# Patient Record
Sex: FEMALE | Race: WHITE | NOT HISPANIC OR LATINO | URBAN - METROPOLITAN AREA
[De-identification: names, ages, dates, MRNs, and addresses within clinical notes are randomized per-mention and may not be internally consistent; named-entity substitution may affect disease eponyms.]

---

## 2020-09-21 ENCOUNTER — HISTORICAL (OUTPATIENT)
Dept: ADMINISTRATIVE | Facility: HOSPITAL | Age: 65
End: 2020-09-21

## 2021-06-18 ENCOUNTER — HISTORICAL (OUTPATIENT)
Dept: ADMINISTRATIVE | Facility: HOSPITAL | Age: 66
End: 2021-06-18

## 2021-06-24 ENCOUNTER — HISTORICAL (OUTPATIENT)
Dept: SURGERY | Facility: HOSPITAL | Age: 66
End: 2021-06-24

## 2021-08-09 ENCOUNTER — HISTORICAL (OUTPATIENT)
Dept: ADMINISTRATIVE | Facility: HOSPITAL | Age: 66
End: 2021-08-09

## 2021-09-20 ENCOUNTER — HISTORICAL (OUTPATIENT)
Dept: ADMINISTRATIVE | Facility: HOSPITAL | Age: 66
End: 2021-09-20

## 2022-04-07 ENCOUNTER — HISTORICAL (OUTPATIENT)
Dept: ADMINISTRATIVE | Facility: HOSPITAL | Age: 67
End: 2022-04-07

## 2022-04-24 VITALS
HEIGHT: 66 IN | DIASTOLIC BLOOD PRESSURE: 76 MMHG | SYSTOLIC BLOOD PRESSURE: 128 MMHG | OXYGEN SATURATION: 98 % | WEIGHT: 152 LBS | BODY MASS INDEX: 24.43 KG/M2

## 2022-04-25 LAB
AGE: 67
ALBUMIN SERPL-MCNC: 4.2 G/DL (ref 3.4–5)
ALBUMIN/GLOB SERPL: 1.8 {RATIO}
ALP SERPL-CCNC: 76 U/L (ref 50–144)
ALT SERPL-CCNC: 26 U/L (ref 1–45)
ANION GAP SERPL CALC-SCNC: 3 MMOL/L (ref 2–13)
AST SERPL-CCNC: 35 U/L (ref 14–36)
BILIRUB SERPL-MCNC: 1.02 MG/DL (ref 0–1)
BUN SERPL-MCNC: 15 MG/DL (ref 7–20)
CALCIUM SERPL-MCNC: 8.9 MG/DL (ref 8.4–10.2)
CHLORIDE SERPL-SCNC: 108 MMOL/L (ref 98–110)
CHOLEST SERPL-MCNC: 211 MG/DL (ref 0–200)
CO2 SERPL-SCNC: 29 MMOL/L (ref 21–32)
CREAT SERPL-MCNC: 0.74 MG/DL (ref 0.52–1.04)
CREAT/UREA NIT SERPL: 20.3 (ref 12–20)
DEPRECATED CALCIDIOL+CALCIFEROL SERPL-MC: 26.4 NG/ML (ref 30–100)
GLOBULIN SER-MCNC: 2.3 G/DL (ref 2–3.9)
GLUCOSE SERPL-MCNC: 95 MG/DL (ref 70–115)
HDLC SERPL-MCNC: 71 MG/DL (ref 40–60)
LDLC SERPL CALC-MCNC: 97.1 MG/DL (ref 30–100)
POTASSIUM SERPL-SCNC: 4.2 MMOL/L (ref 3.5–5.1)
PROT SERPL-MCNC: 6.5 G/DL (ref 6.3–8.2)
SODIUM SERPL-SCNC: 140 MMOL/L (ref 135–145)
TRIGL SERPL-MCNC: 79 MG/DL (ref 30–200)

## 2022-04-30 NOTE — OP NOTE
Patient:   Raul Rainey            MRN: [id number]            FIN: [id number]               Age:   66 years     Sex:  Female     :  1955   Associated Diagnoses:   None   Author:   David Elizabeth Jr, MD      Preoperative diagnosis: Left distal radius fracture, intra-articular greater than 3 parts    Postoperative diagnosis: Left distal radius fracture, intra-articular greater than 3 parts    Attending surgeon: David Elizabeth MD    Procedure: Open reduction internal fixation of left distal radius fracture, intra-articular greater than 3 parts   Anesthesia: General plus regional block    Implants: Biomet DVR plate    Estimated blood loss: Minimal    Tourniquet time: About 30 minutes    Complications: None    History of present illness: Raul is a pleasant 66-year-old who sustained a distal radius fracture.  Radiographs revealed unacceptable alignment.  I recommended open reduction internal fixation.  The risks, benefits, and alternatives to therapy were presented.  The patient elects to proceed    Procedure: Raul was initially seen in the preoperative unit where a history and physical were reviewed without change.  The operative extremity was marked.  Consents were reviewed.  All questions were answered.  Anesthesia performed a preoperative block.  The patient was then taken to the operating room placed supine on the operative table where general anesthesia was induced.  Perioperative antibiotics were administered.  The operative extremity was prepped and draped in sterile fashion.  An attending lead timeout confirmed the operative side; I then began the procedure.    I made a standard volar Colby approach to the wrist.  I sharply incised through skin and subcutaneous tissue.  Identified the FCR tendon sheath and incised this in line with the incision.  I retracted the FCR ulnarly and incised through the deep fascia.  I swept the flexor pollicis longus out of the way and retracted ulnarly.  I  identified the pronator and made a inverted L incision in the pronator.  I then identified the fracture site which I reduced using a combination of manual traction and osteotome.  I provisionally pinned the fracture with a K wire.  I confirm reduction under fluoroscopy.  I then placed a Biomet DVR plate [place] on the distal radius.  I confirm placement of the plate under fluoroscopy.  I then placed a K wire in the distal hole and checked this on the lateral radiograph.  The wire was extra-articular.  I then placed 1 screw in the oblique hole in the shaft to give me some freedom in the distal and proximal plane.  I then placed a screw in the distal cluster and again confirmed placement and reduction under fluoroscopy.  Happy with this, I filled the distal cluster and lock the construct with 2 additional shaft screws.  The final construct was confirmed under fluoroscopy.  The hardware was stable in the fracture reduction was anatomic.  I copiously irrigated the wounds.  I released the tourniquet and obtained hemostasis.  The subcutaneous layer was closed with 2-0 Vicryl.  The skin was closed with a 3-0 Monocryl.  A sterile dressing and a resting hand splint were applied.  The patient was awoken unremarkable anesthesia and transfer the postoperative unit in good condition.

## 2022-05-16 ENCOUNTER — HISTORICAL (OUTPATIENT)
Dept: ADMINISTRATIVE | Facility: HOSPITAL | Age: 67
End: 2022-05-16

## 2022-12-02 LAB — BCS RECOMMENDATION EXT: NORMAL

## 2023-01-23 ENCOUNTER — DOCUMENTATION ONLY (OUTPATIENT)
Dept: ADMINISTRATIVE | Facility: HOSPITAL | Age: 68
End: 2023-01-23
Payer: MEDICARE

## 2023-05-16 ENCOUNTER — OFFICE VISIT (OUTPATIENT)
Dept: FAMILY MEDICINE | Facility: CLINIC | Age: 68
End: 2023-05-16
Payer: MEDICARE

## 2023-05-16 VITALS
OXYGEN SATURATION: 98 % | HEIGHT: 67 IN | HEART RATE: 70 BPM | DIASTOLIC BLOOD PRESSURE: 72 MMHG | WEIGHT: 154 LBS | TEMPERATURE: 98 F | SYSTOLIC BLOOD PRESSURE: 122 MMHG | BODY MASS INDEX: 24.17 KG/M2 | RESPIRATION RATE: 20 BRPM

## 2023-05-16 DIAGNOSIS — Z12.11 ENCOUNTER FOR COLORECTAL CANCER SCREENING: ICD-10-CM

## 2023-05-16 DIAGNOSIS — Z86.39 HISTORY OF VITAMIN D DEFICIENCY: ICD-10-CM

## 2023-05-16 DIAGNOSIS — Z12.12 ENCOUNTER FOR COLORECTAL CANCER SCREENING: ICD-10-CM

## 2023-05-16 DIAGNOSIS — E55.9 VITAMIN D DEFICIENCY: ICD-10-CM

## 2023-05-16 DIAGNOSIS — Z86.39 HISTORY OF NON ANEMIC VITAMIN B12 DEFICIENCY: ICD-10-CM

## 2023-05-16 DIAGNOSIS — Z79.899 LONG TERM USE OF DRUG: Primary | ICD-10-CM

## 2023-05-16 DIAGNOSIS — R19.00 PELVIC FULLNESS IN FEMALE: ICD-10-CM

## 2023-05-16 DIAGNOSIS — I10 PRIMARY HYPERTENSION: ICD-10-CM

## 2023-05-16 DIAGNOSIS — E53.8 VITAMIN B12 DEFICIENCY: ICD-10-CM

## 2023-05-16 DIAGNOSIS — Z13.9 SCREENING DUE: ICD-10-CM

## 2023-05-16 DIAGNOSIS — E78.5 HYPERLIPIDEMIA, UNSPECIFIED HYPERLIPIDEMIA TYPE: ICD-10-CM

## 2023-05-16 LAB
ABS NEUT CALC (OHS): 1.96 X10(3)/MCL (ref 2.1–9.2)
ALBUMIN SERPL-MCNC: 4.5 G/DL (ref 3.4–5)
ALBUMIN/GLOB SERPL: 1.6 RATIO
ALP SERPL-CCNC: 75 UNIT/L (ref 50–144)
ALT SERPL-CCNC: 18 UNIT/L (ref 1–45)
ANION GAP SERPL CALC-SCNC: 6 MEQ/L (ref 2–13)
AST SERPL-CCNC: 24 UNIT/L (ref 14–36)
BILIRUBIN DIRECT+TOT PNL SERPL-MCNC: 0.9 MG/DL (ref 0–1)
BUN SERPL-MCNC: 14 MG/DL (ref 7–20)
CALCIUM SERPL-MCNC: 9.6 MG/DL (ref 8.4–10.2)
CHLORIDE SERPL-SCNC: 105 MMOL/L (ref 98–110)
CHOLEST SERPL-MCNC: 244 MG/DL (ref 0–200)
CO2 SERPL-SCNC: 29 MMOL/L (ref 21–32)
CREAT SERPL-MCNC: 0.78 MG/DL (ref 0.66–1.25)
CREAT/UREA NIT SERPL: 18 (ref 12–20)
DEPRECATED CALCIDIOL+CALCIFEROL SERPL-MC: 26.6 NG/ML (ref 30–100)
EOSINOPHIL NFR BLD MANUAL: 0.23 X10(3)/MCL (ref 0–0.9)
EOSINOPHIL NFR BLD MANUAL: 6 % (ref 0–8)
ERYTHROCYTE [DISTWIDTH] IN BLOOD BY AUTOMATED COUNT: 12.4 % (ref 11–14.5)
GFR SERPLBLD CREATININE-BSD FMLA CKD-EPI: 82 MLS/MIN/1.73/M2
GLOBULIN SER-MCNC: 2.8 GM/DL (ref 2–3.9)
GLUCOSE SERPL-MCNC: 94 MG/DL (ref 70–115)
HCT VFR BLD AUTO: 45.2 % (ref 36–48)
HDLC SERPL-MCNC: 69 MG/DL (ref 40–60)
HGB BLD-MCNC: 15 G/DL (ref 11.8–16)
IMM GRANULOCYTES # BLD AUTO: 0 X10(3)/MCL (ref 0–0.03)
IMM GRANULOCYTES NFR BLD AUTO: 0 % (ref 0–0.5)
IRF (OHS): 7.6 %
LDLC SERPL DIRECT ASSAY-SCNC: 131.6 MG/DL (ref 30–100)
LYMPHOCYTES NFR BLD MANUAL: 1.28 X10(3)/MCL
LYMPHOCYTES NFR BLD MANUAL: 34 % (ref 13–40)
MCH RBC QN AUTO: 28.9 PG (ref 27–34)
MCHC RBC AUTO-ENTMCNC: 33.2 G/DL (ref 31–37)
MCV RBC AUTO: 87.1 FL (ref 79–99)
MONOCYTES NFR BLD MANUAL: 0.3 X10(3)/MCL (ref 0.1–1.3)
MONOCYTES NFR BLD MANUAL: 8 % (ref 2–11)
NEUTROPHILS NFR BLD MANUAL: 52 % (ref 47–80)
NRBC BLD AUTO-RTO: 0 %
PLATELET # BLD AUTO: 347 X10(3)/MCL (ref 140–371)
PLATELET # BLD EST: ADEQUATE 10*3/UL
PMV BLD AUTO: 9.7 FL (ref 9.4–12.4)
POTASSIUM SERPL-SCNC: 4.5 MMOL/L (ref 3.5–5.1)
PROT SERPL-MCNC: 7.3 GM/DL (ref 6.3–8.2)
RBC # BLD AUTO: 5.19 X10(6)/MCL (ref 4–5.1)
RBC MORPH BLD: NORMAL
RET# (OHS): 0.06 (ref 0.02–0.08)
RET-HE (OHS): 31.8 PG
RETICULOCYTE COUNT AUTOMATED (OLG): 1.08 % (ref 0.6–2.7)
SODIUM SERPL-SCNC: 140 MMOL/L (ref 135–145)
TRIGL SERPL-MCNC: 142 MG/DL (ref 30–200)
VIT B12 SERPL-MCNC: 856 PG/ML (ref 211–946)
WBC # SPEC AUTO: 3.77 X10(3)/MCL (ref 4–11.5)

## 2023-05-16 PROCEDURE — 80053 COMPREHEN METABOLIC PANEL: CPT | Performed by: NURSE PRACTITIONER

## 2023-05-16 PROCEDURE — 99213 PR OFFICE/OUTPT VISIT, EST, LEVL III, 20-29 MIN: ICD-10-PCS | Mod: ,,, | Performed by: NURSE PRACTITIONER

## 2023-05-16 PROCEDURE — 86803 HEPATITIS C AB TEST: CPT | Performed by: NURSE PRACTITIONER

## 2023-05-16 PROCEDURE — 85027 COMPLETE CBC AUTOMATED: CPT | Performed by: NURSE PRACTITIONER

## 2023-05-16 PROCEDURE — 99213 OFFICE O/P EST LOW 20 MIN: CPT | Mod: ,,, | Performed by: NURSE PRACTITIONER

## 2023-05-16 PROCEDURE — 80061 LIPID PANEL: CPT | Performed by: NURSE PRACTITIONER

## 2023-05-16 PROCEDURE — 82607 VITAMIN B-12: CPT | Performed by: NURSE PRACTITIONER

## 2023-05-16 PROCEDURE — 82306 VITAMIN D 25 HYDROXY: CPT | Performed by: NURSE PRACTITIONER

## 2023-05-16 PROCEDURE — 85045 AUTOMATED RETICULOCYTE COUNT: CPT | Performed by: NURSE PRACTITIONER

## 2023-05-16 NOTE — ASSESSMENT & PLAN NOTE
Will notify with results of lab draw when available. Continue current treatment until results available.

## 2023-05-18 ENCOUNTER — TELEPHONE (OUTPATIENT)
Dept: FAMILY MEDICINE | Facility: CLINIC | Age: 68
End: 2023-05-18
Payer: MEDICARE

## 2023-05-18 LAB — MAYO GENERIC ORDERABLE RESULT: NORMAL

## 2023-05-18 NOTE — TELEPHONE ENCOUNTER
----- Message from Maggi Santana DNP sent at 5/18/2023  6:37 AM CDT -----  Notify slightly lower wbc, viral infection in past, but no anemia, can a ferritin be added? LDL up to 131 again despite diet and exercise, was at goal at 97, may try diet with increased plant fats, continue exercise, increase fish intake, but may require low dose statin if body metabolizing healthy diet with problems still. Vitamin D still low at 26, add extra 1000 iu daily, b12 good, continue cozaar for bp add diet and supplementation vitamin D, recheck cmp, flp, vitamin D, tsh, cbc in 6 months

## 2023-05-18 NOTE — PROGRESS NOTES
Notify slightly lower wbc, viral infection in past, but no anemia, can a ferritin be added? LDL up to 131 again despite diet and exercise, was at goal at 97, may try diet with increased plant fats, continue exercise, increase fish intake, but may require low dose statin if body metabolizing healthy diet with problems still. Vitamin D still low at 26, add extra 1000 iu daily, b12 good, continue cozaar for bp add diet and supplementation vitamin D, recheck cmp, flp, vitamin D, tsh, cbc in 6 months

## 2023-05-25 DIAGNOSIS — Z01.419 ENCOUNTER FOR ANNUAL ROUTINE GYNECOLOGICAL EXAMINATION: Primary | ICD-10-CM

## 2023-05-25 DIAGNOSIS — R19.00 PELVIC FULLNESS IN FEMALE: ICD-10-CM

## 2023-05-30 DIAGNOSIS — Z01.419 ENCOUNTER FOR ANNUAL ROUTINE GYNECOLOGICAL EXAMINATION: ICD-10-CM

## 2023-05-30 DIAGNOSIS — R19.00 PELVIC FULLNESS IN FEMALE: Primary | ICD-10-CM

## 2023-06-16 LAB — NONINV COLON CA DNA+OCC BLD SCRN STL QL: NEGATIVE

## 2023-06-18 DIAGNOSIS — I10 HYPERTENSION, UNSPECIFIED TYPE: ICD-10-CM

## 2023-06-19 RX ORDER — LOSARTAN POTASSIUM 50 MG/1
TABLET ORAL
Qty: 90 TABLET | Refills: 0 | Status: SHIPPED | OUTPATIENT
Start: 2023-06-19 | End: 2023-09-18

## 2023-06-20 ENCOUNTER — TELEPHONE (OUTPATIENT)
Dept: FAMILY MEDICINE | Facility: CLINIC | Age: 68
End: 2023-06-20
Payer: MEDICARE

## 2023-06-20 NOTE — TELEPHONE ENCOUNTER
----- Message from Maggi Santana DNP sent at 6/19/2023  6:20 PM CDT -----  Notify bakari mullins, complete qa and place reminder 3 years.

## 2023-06-22 LAB — PAP RECOMMENDATION EXT: NORMAL

## 2023-06-28 ENCOUNTER — DOCUMENTATION ONLY (OUTPATIENT)
Dept: FAMILY MEDICINE | Facility: CLINIC | Age: 68
End: 2023-06-28
Payer: MEDICARE

## 2023-09-19 ENCOUNTER — OFFICE VISIT (OUTPATIENT)
Dept: URGENT CARE | Facility: CLINIC | Age: 68
End: 2023-09-19
Payer: MEDICARE

## 2023-09-19 ENCOUNTER — TELEPHONE (OUTPATIENT)
Dept: FAMILY MEDICINE | Facility: CLINIC | Age: 68
End: 2023-09-19
Payer: MEDICARE

## 2023-09-19 VITALS
HEIGHT: 66 IN | WEIGHT: 154 LBS | SYSTOLIC BLOOD PRESSURE: 136 MMHG | HEART RATE: 87 BPM | OXYGEN SATURATION: 97 % | DIASTOLIC BLOOD PRESSURE: 82 MMHG | TEMPERATURE: 98 F | RESPIRATION RATE: 18 BRPM | BODY MASS INDEX: 24.75 KG/M2

## 2023-09-19 DIAGNOSIS — R42 DIZZINESS: Primary | ICD-10-CM

## 2023-09-19 PROCEDURE — 99213 OFFICE O/P EST LOW 20 MIN: CPT | Mod: ,,, | Performed by: NURSE PRACTITIONER

## 2023-09-19 PROCEDURE — 99213 PR OFFICE/OUTPT VISIT, EST, LEVL III, 20-29 MIN: ICD-10-PCS | Mod: ,,, | Performed by: NURSE PRACTITIONER

## 2023-09-19 RX ORDER — ONDANSETRON 8 MG/1
8 TABLET, ORALLY DISINTEGRATING ORAL
Status: COMPLETED | OUTPATIENT
Start: 2023-09-19 | End: 2023-09-19

## 2023-09-19 RX ORDER — MECLIZINE HYDROCHLORIDE 25 MG/1
25 TABLET ORAL 3 TIMES DAILY PRN
Qty: 21 TABLET | Refills: 0 | Status: SHIPPED | OUTPATIENT
Start: 2023-09-19

## 2023-09-19 RX ORDER — ONDANSETRON 4 MG/1
4 TABLET, FILM COATED ORAL EVERY 6 HOURS PRN
Qty: 15 TABLET | Refills: 0 | Status: SHIPPED | OUTPATIENT
Start: 2023-09-19 | End: 2023-09-24

## 2023-09-19 RX ADMIN — ONDANSETRON 8 MG: 8 TABLET, ORALLY DISINTEGRATING ORAL at 03:09

## 2023-09-19 NOTE — PROGRESS NOTES
"Subjective:      Patient ID: Raul Rainey is a 68 y.o. female.    Vitals:  height is 5' 6" (1.676 m) and weight is 69.9 kg (154 lb). Her temperature is 97.9 °F (36.6 °C). Her blood pressure is 136/82 and her pulse is 87. Her respiration is 18 and oxygen saturation is 97%.     Chief Complaint: Dizziness ( Patient is a 68 y.o. female who presents to urgent care with complaints of dizziness and cant keep anything down. Has been dealing with the dizziness for about a week but has gotten bad the last few days and the vomiting started today. )    This is a 68-year-old female presents to urgent care with a 4-5 day history of vertigo which has significantly worsened over the last 2 days now being worsened with nausea and vomiting.  Patient states while she is lying flat her symptoms diminish and are mostly non-existent but when she stands rapidly or sits up she has moderate dizziness.  Now she also has intermittent nausea.  She does state having a episode like this before with inner ear effusion causing slight vertigo but this was totally resolved with over-the-counter Zyrtec quickly.  Denies any fever body aches.  She denies any confusion numbness tingling weakness.   states she is been acting completely normal and shows no neurological deficits.  She states that when she lays flat the symptoms are totally resolved        Gastrointestinal:  Positive for nausea and vomiting.   Neurological:  Positive for dizziness. Negative for history of vertigo.      Objective:     Physical Exam   Constitutional: She is oriented to person, place, and time. She appears well-developed. She is cooperative.  Non-toxic appearance. She does not appear ill. No distress.   HENT:   Head: Normocephalic and atraumatic.   Ears:   Right Ear: Hearing, tympanic membrane, external ear and ear canal normal.   Left Ear: Hearing, tympanic membrane, external ear and ear canal normal.   Nose: Nose normal. No mucosal edema, rhinorrhea or nasal " deformity. No epistaxis. Right sinus exhibits no maxillary sinus tenderness and no frontal sinus tenderness. Left sinus exhibits no maxillary sinus tenderness and no frontal sinus tenderness.   Mouth/Throat: Uvula is midline, oropharynx is clear and moist and mucous membranes are normal. No trismus in the jaw. Normal dentition. No uvula swelling. No oropharyngeal exudate, posterior oropharyngeal edema or posterior oropharyngeal erythema.   Eyes: Conjunctivae and lids are normal. No scleral icterus.   Neck: Trachea normal and phonation normal. Neck supple. No edema present. No erythema present. No neck rigidity present.   Cardiovascular: Normal rate, regular rhythm, normal heart sounds and normal pulses.   Pulmonary/Chest: Effort normal and breath sounds normal. No respiratory distress. She has no decreased breath sounds. She has no rhonchi.   Abdominal: Normal appearance.   Musculoskeletal: Normal range of motion.         General: No deformity. Normal range of motion.   Neurological: no focal deficit. She is alert and oriented to person, place, and time. She displays no weakness, no atrophy and no tremor. She exhibits normal muscle tone. She has a normal Tandem Gait Test. She displays no seizure activity. Coordination and gait normal.      Comments: Positive Nella-Hallpike   Skin: Skin is warm, dry, intact, not diaphoretic and not pale.   Psychiatric: Her speech is normal and behavior is normal. Judgment and thought content normal.   Nursing note and vitals reviewed.      Assessment:     1. Dizziness        Plan:   We will treat with meclizine for vertigo symptoms along with Zofran for nausea.  Discussed with patient that she is to stay as well hydrated as possible and if any of her symptoms were to worsen she needs to seek medical treatment immediately in the nearest emergency room of her choosing.  Also if her symptoms do not improve in the next 2 to three-day she also needs to be re-evaluated.    Dizziness    Other  orders  -     ondansetron disintegrating tablet 8 mg  -     ondansetron (ZOFRAN) 4 MG tablet; Take 1 tablet (4 mg total) by mouth every 6 (six) hours as needed for Nausea.  Dispense: 15 tablet; Refill: 0  -     meclizine (ANTIVERT) 25 mg tablet; Take 1 tablet (25 mg total) by mouth 3 (three) times daily as needed for Dizziness.  Dispense: 21 tablet; Refill: 0

## 2023-09-19 NOTE — TELEPHONE ENCOUNTER
----- Message from Ines Hao sent at 9/18/2023  5:15 PM CDT -----  Regarding: Need appt?      She is still light headed and equilibrium is off.  She was told that she had fluid in her ears, could that be the cause of her issue?  She has been taking her sinus meds that we gave her and it has not helped.    She has a Wellness on 10/30/23 and have not see her since 5/16/2023.    Does she needs an appt to be seen for this issue or do you want to call some thing out?

## 2023-09-19 NOTE — PATIENT INSTRUCTIONS
Medications sent to pharmacy take this as directed.  If any of your symptoms would worsen please seek medical treatment immediately in the nearest emergency room if your choice.    Please follow-up with your PCP on Monday

## 2023-10-30 ENCOUNTER — OFFICE VISIT (OUTPATIENT)
Dept: FAMILY MEDICINE | Facility: CLINIC | Age: 68
End: 2023-10-30
Payer: MEDICARE

## 2023-10-30 VITALS
HEIGHT: 65 IN | BODY MASS INDEX: 25.99 KG/M2 | OXYGEN SATURATION: 98 % | TEMPERATURE: 97 F | DIASTOLIC BLOOD PRESSURE: 84 MMHG | RESPIRATION RATE: 20 BRPM | HEART RATE: 82 BPM | SYSTOLIC BLOOD PRESSURE: 133 MMHG | WEIGHT: 156 LBS

## 2023-10-30 DIAGNOSIS — I10 PRIMARY HYPERTENSION: ICD-10-CM

## 2023-10-30 DIAGNOSIS — R19.00 PELVIC FULLNESS IN FEMALE: ICD-10-CM

## 2023-10-30 DIAGNOSIS — E78.5 HYPERLIPIDEMIA, UNSPECIFIED HYPERLIPIDEMIA TYPE: ICD-10-CM

## 2023-10-30 DIAGNOSIS — I10 HYPERTENSION, UNSPECIFIED TYPE: ICD-10-CM

## 2023-10-30 DIAGNOSIS — Z28.21 IMMUNIZATION DECLINED: ICD-10-CM

## 2023-10-30 DIAGNOSIS — Z86.39 HISTORY OF VITAMIN D DEFICIENCY: Primary | ICD-10-CM

## 2023-10-30 DIAGNOSIS — Z71.89 COUNSELING REGARDING ADVANCED DIRECTIVES AND GOALS OF CARE: ICD-10-CM

## 2023-10-30 DIAGNOSIS — Z00.00 MEDICARE ANNUAL WELLNESS VISIT, SUBSEQUENT: ICD-10-CM

## 2023-10-30 DIAGNOSIS — E55.9 VITAMIN D DEFICIENCY: ICD-10-CM

## 2023-10-30 DIAGNOSIS — Z79.899 LONG TERM USE OF DRUG: ICD-10-CM

## 2023-10-30 LAB
ALBUMIN SERPL-MCNC: 4.5 G/DL (ref 3.4–5)
ALBUMIN/GLOB SERPL: 1.7 RATIO
ALP SERPL-CCNC: 71 UNIT/L (ref 50–144)
ALT SERPL-CCNC: 22 UNIT/L (ref 1–45)
ANION GAP SERPL CALC-SCNC: 5 MEQ/L (ref 2–13)
AST SERPL-CCNC: 26 UNIT/L (ref 14–36)
BASOPHILS # BLD AUTO: 0.03 X10(3)/MCL (ref 0.01–0.08)
BASOPHILS NFR BLD AUTO: 0.7 % (ref 0.1–1.2)
BILIRUB SERPL-MCNC: 0.9 MG/DL (ref 0–1)
BUN SERPL-MCNC: 18 MG/DL (ref 7–20)
CALCIUM SERPL-MCNC: 9.7 MG/DL (ref 8.4–10.2)
CHLORIDE SERPL-SCNC: 106 MMOL/L (ref 98–110)
CHOLEST SERPL-MCNC: 268 MG/DL (ref 0–200)
CO2 SERPL-SCNC: 28 MMOL/L (ref 21–32)
CREAT SERPL-MCNC: 0.77 MG/DL (ref 0.66–1.25)
CREAT/UREA NIT SERPL: 23 (ref 12–20)
DEPRECATED CALCIDIOL+CALCIFEROL SERPL-MC: 79.2 NG/ML (ref 30–80)
EOSINOPHIL # BLD AUTO: 0.14 X10(3)/MCL (ref 0.04–0.36)
EOSINOPHIL NFR BLD AUTO: 3.4 % (ref 0.7–7)
ERYTHROCYTE [DISTWIDTH] IN BLOOD BY AUTOMATED COUNT: 12.8 % (ref 11–14.5)
EST. AVERAGE GLUCOSE BLD GHB EST-MCNC: 105.4 MG/DL (ref 70–115)
GFR SERPLBLD CREATININE-BSD FMLA CKD-EPI: 84 MLS/MIN/1.73/M2
GLOBULIN SER-MCNC: 2.7 GM/DL (ref 2–3.9)
GLUCOSE SERPL-MCNC: 95 MG/DL (ref 70–115)
HBA1C MFR BLD: 5.3 % (ref 4–6)
HCT VFR BLD AUTO: 45 % (ref 36–48)
HDLC SERPL-MCNC: 71 MG/DL (ref 40–60)
HGB BLD-MCNC: 14.9 G/DL (ref 11.8–16)
IMM GRANULOCYTES # BLD AUTO: 0.01 X10(3)/MCL (ref 0–0.03)
IMM GRANULOCYTES NFR BLD AUTO: 0.2 % (ref 0–0.5)
LDLC SERPL DIRECT ASSAY-SCNC: 138.3 MG/DL (ref 30–100)
LYMPHOCYTES # BLD AUTO: 1.48 X10(3)/MCL (ref 1.16–3.74)
LYMPHOCYTES NFR BLD AUTO: 35.6 % (ref 20–55)
MCH RBC QN AUTO: 28.5 PG (ref 27–34)
MCHC RBC AUTO-ENTMCNC: 33.1 G/DL (ref 31–37)
MCV RBC AUTO: 86 FL (ref 79–99)
MONOCYTES # BLD AUTO: 0.28 X10(3)/MCL (ref 0.24–0.36)
MONOCYTES NFR BLD AUTO: 6.7 % (ref 4.7–12.5)
NEUTROPHILS # BLD AUTO: 2.22 X10(3)/MCL (ref 1.56–6.13)
NEUTROPHILS NFR BLD AUTO: 53.4 % (ref 37–73)
NRBC BLD AUTO-RTO: 0 %
PLATELET # BLD AUTO: 346 X10(3)/MCL (ref 140–371)
PMV BLD AUTO: 9.3 FL (ref 9.4–12.4)
POTASSIUM SERPL-SCNC: 4.2 MMOL/L (ref 3.5–5.1)
PROT SERPL-MCNC: 7.2 GM/DL (ref 6.3–8.2)
RBC # BLD AUTO: 5.23 X10(6)/MCL (ref 4–5.1)
SODIUM SERPL-SCNC: 139 MMOL/L (ref 135–145)
TRIGL SERPL-MCNC: 159 MG/DL (ref 30–200)
TSH SERPL-ACNC: 0.49 UIU/ML (ref 0.36–3.74)
WBC # SPEC AUTO: 4.16 X10(3)/MCL (ref 4–11.5)

## 2023-10-30 PROCEDURE — 85025 COMPLETE CBC W/AUTO DIFF WBC: CPT | Performed by: NURSE PRACTITIONER

## 2023-10-30 PROCEDURE — 80053 COMPREHEN METABOLIC PANEL: CPT | Performed by: NURSE PRACTITIONER

## 2023-10-30 PROCEDURE — G0439 PPPS, SUBSEQ VISIT: HCPCS | Mod: ,,, | Performed by: NURSE PRACTITIONER

## 2023-10-30 PROCEDURE — 82306 VITAMIN D 25 HYDROXY: CPT | Performed by: NURSE PRACTITIONER

## 2023-10-30 PROCEDURE — 80061 LIPID PANEL: CPT | Performed by: NURSE PRACTITIONER

## 2023-10-30 PROCEDURE — G0439 PR MEDICARE ANNUAL WELLNESS SUBSEQUENT VISIT: ICD-10-PCS | Mod: ,,, | Performed by: NURSE PRACTITIONER

## 2023-10-30 PROCEDURE — 83036 HEMOGLOBIN GLYCOSYLATED A1C: CPT | Performed by: NURSE PRACTITIONER

## 2023-10-30 PROCEDURE — 84443 ASSAY THYROID STIM HORMONE: CPT | Performed by: NURSE PRACTITIONER

## 2023-10-30 RX ORDER — LOSARTAN POTASSIUM 50 MG/1
50 TABLET ORAL DAILY
Qty: 90 TABLET | Refills: 1 | Status: SHIPPED | OUTPATIENT
Start: 2023-10-30 | End: 2023-12-18

## 2023-10-30 NOTE — PROGRESS NOTES
Subjective:       Patient ID: Raul Rainey is a 68 y.o. female.    Chief Complaint: Medicare AWV (Pt here for her medicare wellness - she states she is doing well with no problems. Refuses flu vaccine. )    The patient returns for Medicare wellness visit but also need flu and pneumococcal vaccines but declines.  Blood pressure has been controlled quite well lately in the 120s sometimes up to the 130s when upset 1 time 1:40 a.m. but well-controlled taking the medication without any problems taking to 125 mcg daily for vitamin-D for history of low.  Also up-to-date with pelvic exam still having some urinary frequencies in emptying of bladder intermittently but does not feel that it is problem at this time.  We will be due for mammogram after December 2, 2023.Cologuard good for next 2 years.         Past Medical History:  has a past medical history of Hypertension.    Surgical History:  has a past surgical history that includes orthopedic sx  (Left).    Family History: family history includes Cancer in her mother; Heart disease in her father; Hypertension in her father.    Social History:  reports that she has never smoked. She has never used smokeless tobacco. She reports current alcohol use. She reports that she does not use drugs.    Current Outpatient Medications   Medication Instructions    CHOLECALCIFEROL, VITAMIN D3, ORAL 50 mcg, Oral    losartan (COZAAR) 50 mg, Oral, Daily    meclizine (ANTIVERT) 25 mg, Oral, 3 times daily PRN    MULTIVITAMIN ORAL 1 tablet, Oral, Every morning       Patient has No Known Allergies.     Patient Care Team:  Maggi Santana DNP as PCP - General (Family Medicine)  Maggi Santana DNP (Family Medicine)    Patient Reported Health Risk Assessments:  What [age] your age?: 65-69  Are you male or female?: Female  During the past four weeks, how much have you been bothered by emotional problems such as feeling anxious, depressed, irritable, sad, or downhearted and blue?: Not at all  During  the past five weeks, has your physical and/or emotional health limited your social activities with family, friends, neighbors, or groups?: Not at all  During the past four weeks, how much bodily pain have you generally had?: Very mild pain  During the past four weeks, was someone available to help if you needed and wanted help?: Yes, as much as I wanted  During the past four weeks, what was the hardest physical activity you could do for at least two minutes?: Light  Can you get to places out of walking distance without help?  (For example, can you travel alone on buses or taxis, or drive your own car?): Yes  Can you go shopping for groceries or clothes without someone's help?: Yes  Can you prepare your own meals?: Yes  Can you do your own housework without help?: Yes  Because of any health problems, do you need the help of another person with your personal care needs such as eating, bathing, dressing, or getting around the house?: Yes  Can you handle your own money without help?: Yes  During the past four weeks, how would you rate your health in general?: Very good  How have things been going for you during the past four weeks?: Pretty well  Are you having difficulties driving your car?: No  Do you always fasten your seat belt when you are in a car?: Yes, usually  How often in the past four weeks have you been bothered by falling or dizzy when standing up?: Never  How often in the past four weeks have you been bothered by sexual problems?: Never  How often in the past four weeks have you been bothered by trouble eating well?: Never  How often in the past four weeks have you been bothered by teeth or denture problems?: Seldom  How often in the past four weeks have you been bothered with problems using the telephone?: Never  How often in the past four weeks have you been bothered by tiredness or fatigue?: Never  Have you fallen two or more times in the past year?: No  Are you afraid of falling?: No  Are you a smoker?:  No  During the past four weeks, how many drinks of wine, beer, or other alcoholic beverages did you have?: No alcohol at all  Do you exercise for about 20 minutes three or more days a week?: No, I usually do not exercise this much  Have you been given any information to help you with hazards in your house that might hurt you?: Yes  Have you been given any information to help you with keeping track of your medications?: Yes  How often do you have trouble taking medicines the way you've been told to take them?: I always take them as prescribed  How confident are you that you can control and manage most of your health problems?: Very confident    Review of Systems   Constitutional:  Negative for activity change, chills, fatigue, fever and unexpected weight change.   HENT:  Negative for dental problem, hearing loss and nosebleeds.    Eyes:  Negative for discharge, redness and visual disturbance.   Respiratory:  Negative for cough, chest tightness and wheezing.    Cardiovascular:  Negative for chest pain, palpitations and leg swelling.   Gastrointestinal:  Negative for abdominal distention, blood in stool, change in bowel habit, nausea and vomiting.   Endocrine: Negative for cold intolerance, heat intolerance, polydipsia and polyuria.   Genitourinary:  Negative for difficulty urinating, dysuria, frequency, hematuria and urgency.   Musculoskeletal:  Negative for arthralgias, gait problem and joint deformity.   Allergic/Immunologic: Negative for environmental allergies and food allergies.   Neurological:  Negative for vertigo, tremors, syncope, weakness, light-headedness, numbness, headaches, memory loss and coordination difficulties.   Hematological:  Negative for adenopathy. Does not bruise/bleed easily.   Psychiatric/Behavioral:  Negative for confusion, self-injury, sleep disturbance and suicidal ideas.          Objective:      Physical Exam  Vitals and nursing note reviewed. Exam conducted with a chaperone present.    HENT:      Head: Normocephalic.      Nose: Rhinorrhea present.      Mouth/Throat:      Mouth: Mucous membranes are moist.      Pharynx: Oropharynx is clear.   Eyes:      Extraocular Movements: Extraocular movements intact.   Cardiovascular:      Rate and Rhythm: Normal rate.      Pulses: Normal pulses.   Pulmonary:      Effort: Pulmonary effort is normal.   Chest:   Breasts:     Right: Normal.      Left: Normal.   Abdominal:      General: Bowel sounds are normal.      Palpations: Abdomen is soft.   Musculoskeletal:      Cervical back: Normal range of motion.   Lymphadenopathy:      Upper Body:      Right upper body: No supraclavicular, axillary or pectoral adenopathy.      Left upper body: No supraclavicular, axillary or pectoral adenopathy.   Skin:     General: Skin is warm and dry.   Neurological:      Mental Status: She is alert and oriented to person, place, and time.   Psychiatric:         Mood and Affect: Mood normal.         Behavior: Behavior normal.         Assessment/Plan:     Vitals:    10/30/23 0810   BP: 133/84   Pulse: 82   Resp: 20   Temp: 97.2 °F (36.2 °C)        Fall Risk + Home Safety + Living Situation + Whisper Test + Depression Screen + CAGE + Cognitive Impairment Screen + ADL Screen + Timed Get Up and Go + Nutrition Screen + PAQ Screen + Health Risk Assessment all reviewed.          No data to display                  5/16/2023     9:15 AM   Fall Risk Assessment - Outpatient   Mobility Status Ambulatory   Number of falls 0   Identified as fall risk False                     Opioid Screening: Patient medication list reviewed, patient is not taking prescription opioids. Patient is not using additional opioids than prescribed. Patient is not at low risk of substance abuse based on this opioid use history.     The patient's Health Maintenance was reviewed and the following appears to be due at this time:   Health Maintenance Due   Topic Date Due    RSV Vaccine (Age 60+) (1 - 1-dose 60+ series)  Never done    Pneumococcal Vaccines (Age 65+) (1 - PCV) Never done    Hemoglobin A1c (Diabetic Prevention Screening)  07/30/2023    Mammogram  12/02/2023       Advance Directives:   Living Will: No        Oral Declaration: No    [place]: No    Do Not Resuscitate Status: No    Medical Power of : No        Oral Declaration: No      Decision Making:  Patient answered questions  Goals of Care: The patient endorses that what is most important right now is to focus on quality of life, even if it means sacrificing a little time    Accordingly, we have decided that the best plan to meet the patient's goals includes continuing with treatment    Advance Care Planning   Advanced Care Planning: I offered to discuss Advanced Care Planning, which consists of how you would like to be cared for as you end the near of your natural life.  This includes how to pick a person who would make decisions for you if you were unable to make them for yourself, which is called a Medical Power of . These discussions include what kind of decisions you will make regarding life sustaining measures, such as ventilators and feeding tubes, when faced with a life limiting illness recorded on a living will that they will need to know. Spent 20 minutes with the patient/family on the importance of Advanced Care Planning.          1. History of vitamin D deficiency  Assessment & Plan:  Will notify with results of lab draw when available. Continue current treatment until results available.        2. Primary hypertension  Assessment & Plan:  Patient is taking the losartan 50 mg daily blood pressure goal less than 130/80. The current medical regimen is effective;  continue present plan and medications.        3. Counseling regarding advanced directives and goals of care  Assessment & Plan:  Discussed advanced directive with living well and Advanced Directive in the [place].      4. Immunization declined  Assessment & Plan:  Discussed that she  "is due for the RSV pneumonia vaccines but declines those at this time as well as the flu vaccine.      5. Pelvic fullness in female  Assessment & Plan:  Has seen GYN and feeling symptoms have improved.       6. Medicare annual wellness visit, subsequent  Assessment & Plan:  Has intermittent "off balance" and will follow up with ENT for check with crystals on Thursday. Not requiring medication at this time. Up to date with mammogram until       7. Hypertension, unspecified type  Assessment & Plan:  Patient is taking the losartan 50 mg daily blood pressure goal less than 130/80. The current medical regimen is effective;  continue present plan and medications.      Orders:  -     losartan (COZAAR) 50 MG tablet; Take 1 tablet (50 mg total) by mouth once daily.  Dispense: 90 tablet; Refill: 1             Provided Hon[place] with a 5-10 year written screening schedule and personal prevention plan. Recommendations were developed using the USPSTF age appropriate recommendations. Education, counseling, and referrals were provided as needed.  After Visit Summary printed and given to patient which includes a list of additional screenings\tests needed.      Follow up in about 6 months (around 4/30/2024).   "

## 2023-10-30 NOTE — ASSESSMENT & PLAN NOTE
"Has intermittent "off balance" and will follow up with ENT for check with crystals on Thursday. Not requiring medication at this time. Up to date with mammogram until   "

## 2023-10-30 NOTE — ASSESSMENT & PLAN NOTE
Discussed advanced directive with [facility] and Advanced Directive in the [place].

## 2023-10-30 NOTE — ASSESSMENT & PLAN NOTE
Discussed that she is due for the RSV pneumonia vaccines but declines those at this time as well as the flu vaccine.

## 2023-10-31 ENCOUNTER — TELEPHONE (OUTPATIENT)
Dept: FAMILY MEDICINE | Facility: CLINIC | Age: 68
End: 2023-10-31
Payer: MEDICARE

## 2023-10-31 NOTE — TELEPHONE ENCOUNTER
----- Message from Maggi Santana DNP sent at 10/30/2023  6:01 PM CDT -----  Notify normal CMP.  But LDL still climbing higher at 1:38 a.m. goal less than 100 triglycerides good HDL still upper normal vitamin-D upper limits 79 can decrease to 1/2 of vitamin-D intake if cholesterol does not decrease may need to consider medications if diet unable to work recheck CMP FLP vitamin-D and CBC in 6 months

## 2023-12-18 DIAGNOSIS — I10 HYPERTENSION, UNSPECIFIED TYPE: ICD-10-CM

## 2023-12-18 RX ORDER — LOSARTAN POTASSIUM 50 MG/1
50 TABLET ORAL
Qty: 90 TABLET | Refills: 1 | Status: SHIPPED | OUTPATIENT
Start: 2023-12-18

## 2024-01-29 PROBLEM — Z00.00 MEDICARE ANNUAL WELLNESS VISIT, SUBSEQUENT: Status: RESOLVED | Noted: 2023-05-16 | Resolved: 2024-01-29

## 2024-03-28 NOTE — ASSESSMENT & PLAN NOTE
Patient is taking the losartan 50 mg daily blood pressure goal less than 130/80. The current medical regimen is effective;  continue present plan and medications.     Detail Level: Zone Topical Retinoids Recommendations: Differin gel OTC

## 2024-07-23 ENCOUNTER — OFFICE VISIT (OUTPATIENT)
Dept: FAMILY MEDICINE | Facility: CLINIC | Age: 69
End: 2024-07-23
Payer: MEDICARE

## 2024-07-23 VITALS
SYSTOLIC BLOOD PRESSURE: 129 MMHG | HEART RATE: 84 BPM | BODY MASS INDEX: 26.66 KG/M2 | WEIGHT: 160 LBS | RESPIRATION RATE: 20 BRPM | OXYGEN SATURATION: 96 % | TEMPERATURE: 98 F | DIASTOLIC BLOOD PRESSURE: 79 MMHG | HEIGHT: 65 IN

## 2024-07-23 DIAGNOSIS — I10 HYPERTENSION, UNSPECIFIED TYPE: ICD-10-CM

## 2024-07-23 DIAGNOSIS — Z12.31 BREAST CANCER SCREENING BY MAMMOGRAM: ICD-10-CM

## 2024-07-23 DIAGNOSIS — E78.5 HYPERLIPIDEMIA LDL GOAL <130: ICD-10-CM

## 2024-07-23 DIAGNOSIS — E55.9 VITAMIN D DEFICIENCY: Primary | ICD-10-CM

## 2024-07-23 DIAGNOSIS — R19.00 PELVIC FULLNESS IN FEMALE: ICD-10-CM

## 2024-07-23 LAB
ALBUMIN SERPL-MCNC: 4.3 G/DL (ref 3.4–5)
ALBUMIN/GLOB SERPL: 1.5 RATIO
ALP SERPL-CCNC: 74 UNIT/L (ref 50–144)
ALT SERPL-CCNC: 30 UNIT/L (ref 1–45)
ANION GAP SERPL CALC-SCNC: 6 MEQ/L (ref 2–13)
AST SERPL-CCNC: 30 UNIT/L (ref 14–36)
BASOPHILS # BLD AUTO: 0.03 X10(3)/MCL (ref 0.01–0.08)
BASOPHILS NFR BLD AUTO: 0.8 % (ref 0.1–1.2)
BILIRUB SERPL-MCNC: 1.1 MG/DL (ref 0–1)
BUN SERPL-MCNC: 19 MG/DL (ref 7–20)
CALCIUM SERPL-MCNC: 9.9 MG/DL (ref 8.4–10.2)
CHLORIDE SERPL-SCNC: 108 MMOL/L (ref 98–110)
CHOLEST SERPL-MCNC: 257 MG/DL (ref 0–200)
CO2 SERPL-SCNC: 25 MMOL/L (ref 21–32)
CREAT SERPL-MCNC: 0.76 MG/DL (ref 0.66–1.25)
CREAT/UREA NIT SERPL: 25 (ref 12–20)
EOSINOPHIL # BLD AUTO: 0.14 X10(3)/MCL (ref 0.04–0.36)
EOSINOPHIL NFR BLD AUTO: 3.7 % (ref 0.7–7)
ERYTHROCYTE [DISTWIDTH] IN BLOOD BY AUTOMATED COUNT: 12.7 % (ref 11–14.5)
GFR SERPLBLD CREATININE-BSD FMLA CKD-EPI: 84 ML/MIN/1.73/M2
GLOBULIN SER-MCNC: 2.8 GM/DL (ref 2–3.9)
GLUCOSE SERPL-MCNC: 101 MG/DL (ref 70–115)
HCT VFR BLD AUTO: 44.5 % (ref 36–48)
HDLC SERPL-MCNC: 71 MG/DL (ref 40–60)
HGB BLD-MCNC: 14.7 G/DL (ref 11.8–16)
IMM GRANULOCYTES # BLD AUTO: 0 X10(3)/MCL (ref 0–0.03)
IMM GRANULOCYTES NFR BLD AUTO: 0 % (ref 0–0.5)
LDLC SERPL DIRECT ASSAY-SCNC: 140 MG/DL (ref 30–100)
LYMPHOCYTES # BLD AUTO: 1.32 X10(3)/MCL (ref 1.16–3.74)
LYMPHOCYTES NFR BLD AUTO: 34.6 % (ref 20–55)
MCH RBC QN AUTO: 28.6 PG (ref 27–34)
MCHC RBC AUTO-ENTMCNC: 33 G/DL (ref 31–37)
MCV RBC AUTO: 86.6 FL (ref 79–99)
MONOCYTES # BLD AUTO: 0.24 X10(3)/MCL (ref 0.24–0.36)
MONOCYTES NFR BLD AUTO: 6.3 % (ref 4.7–12.5)
NEUTROPHILS # BLD AUTO: 2.09 X10(3)/MCL (ref 1.56–6.13)
NEUTROPHILS NFR BLD AUTO: 54.6 % (ref 37–73)
PLATELET # BLD AUTO: 303 X10(3)/MCL (ref 140–371)
PMV BLD AUTO: 9.6 FL (ref 9.4–12.4)
POTASSIUM SERPL-SCNC: 4.4 MMOL/L (ref 3.5–5.1)
PROT SERPL-MCNC: 7.1 GM/DL (ref 6.3–8.2)
RBC # BLD AUTO: 5.14 X10(6)/MCL (ref 4–5.1)
SODIUM SERPL-SCNC: 139 MMOL/L (ref 136–145)
TRIGL SERPL-MCNC: 153 MG/DL (ref 30–200)
WBC # BLD AUTO: 3.82 X10(3)/MCL (ref 4–11.5)

## 2024-07-23 PROCEDURE — 80061 LIPID PANEL: CPT | Performed by: NURSE PRACTITIONER

## 2024-07-23 PROCEDURE — 3288F FALL RISK ASSESSMENT DOCD: CPT | Mod: ,,, | Performed by: NURSE PRACTITIONER

## 2024-07-23 PROCEDURE — 1159F MED LIST DOCD IN RCRD: CPT | Mod: ,,, | Performed by: NURSE PRACTITIONER

## 2024-07-23 PROCEDURE — 82306 VITAMIN D 25 HYDROXY: CPT | Performed by: NURSE PRACTITIONER

## 2024-07-23 PROCEDURE — 1160F RVW MEDS BY RX/DR IN RCRD: CPT | Mod: ,,, | Performed by: NURSE PRACTITIONER

## 2024-07-23 PROCEDURE — 4010F ACE/ARB THERAPY RXD/TAKEN: CPT | Mod: ,,, | Performed by: NURSE PRACTITIONER

## 2024-07-23 PROCEDURE — 1101F PT FALLS ASSESS-DOCD LE1/YR: CPT | Mod: ,,, | Performed by: NURSE PRACTITIONER

## 2024-07-23 PROCEDURE — 3074F SYST BP LT 130 MM HG: CPT | Mod: ,,, | Performed by: NURSE PRACTITIONER

## 2024-07-23 PROCEDURE — 99214 OFFICE O/P EST MOD 30 MIN: CPT | Mod: ,,, | Performed by: NURSE PRACTITIONER

## 2024-07-23 PROCEDURE — 80053 COMPREHEN METABOLIC PANEL: CPT | Performed by: NURSE PRACTITIONER

## 2024-07-23 PROCEDURE — 85025 COMPLETE CBC W/AUTO DIFF WBC: CPT | Performed by: NURSE PRACTITIONER

## 2024-07-23 PROCEDURE — 3078F DIAST BP <80 MM HG: CPT | Mod: ,,, | Performed by: NURSE PRACTITIONER

## 2024-07-23 PROCEDURE — 3008F BODY MASS INDEX DOCD: CPT | Mod: ,,, | Performed by: NURSE PRACTITIONER

## 2024-07-23 NOTE — PROGRESS NOTES
"SUBJECTIVE:  Raul Rainey is a 69 y.o. female here for Hypertension      HPI  Patient in clinic with 6 month follow-up on hypertension. Patient states that she has been monitoring pressure at home when she thinks about it. No negative side effects to medication. Not needing refills. Patient is needing fasting labs. Patient is due for mammogram. Would like it done at breast center in [place]. GYN no longer on insurance coverage plan. History of uterine dropping with last GYN appointment, but no worse discomfort.     Rosa's allergies, medications, history, and problem list were updated as appropriate.    Review of Systems   Constitutional:  Negative for activity change and unexpected weight change.   HENT:  Negative for hearing loss, rhinorrhea and trouble swallowing.    Eyes:  Negative for discharge and visual disturbance.   Respiratory:  Negative for chest tightness and wheezing.    Cardiovascular:  Negative for chest pain and palpitations.   Gastrointestinal:  Negative for blood in stool, constipation, diarrhea and vomiting.   Endocrine: Negative for polydipsia and polyuria.   Genitourinary:  Negative for difficulty urinating, dysuria, hematuria and menstrual problem.   Musculoskeletal:  Negative for arthralgias, joint swelling and neck pain.   Neurological:  Positive for headaches. Negative for weakness.   Hematological: Negative.    Psychiatric/Behavioral:  Negative for confusion and dysphoric mood.       A comprehensive review of symptoms was completed and negative except as noted above.    OBJECTIVE:  Vital signs  Vitals:    07/23/24 0837   BP: 129/79   BP Location: Left arm   Patient Position: Sitting   Pulse: 84   Resp: 20   Temp: 97.7 °F (36.5 °C)   SpO2: 96%   Weight: 72.6 kg (160 lb)   Height: 5' 5" (1.651 m)        Physical Exam  Vitals and nursing note reviewed.   Constitutional:       General: She is not in acute distress.     Appearance: She is not ill-appearing.   HENT:      Head: Normocephalic " and atraumatic.      Nose: Rhinorrhea present.      Mouth/Throat:      Mouth: Mucous membranes are moist.      Pharynx: Oropharynx is clear.   Eyes:      General: No scleral icterus.     Extraocular Movements: Extraocular movements intact.      Conjunctiva/sclera: Conjunctivae normal.      Pupils: Pupils are equal, round, and reactive to light.   Neck:      Vascular: No carotid bruit.   Cardiovascular:      Rate and Rhythm: Normal rate and regular rhythm.      Pulses: Normal pulses.      Heart sounds: Normal heart sounds. No murmur heard.     No friction rub. No gallop.   Pulmonary:      Effort: Pulmonary effort is normal. No respiratory distress.      Breath sounds: Normal breath sounds. No wheezing, rhonchi or rales.   Abdominal:      General: Abdomen is flat. Bowel sounds are normal. There is no distension.      Palpations: Abdomen is soft. There is no mass.      Tenderness: There is no abdominal tenderness.   Musculoskeletal:         General: Normal range of motion.      Cervical back: Normal range of motion and neck supple.   Skin:     General: Skin is warm and dry.   Neurological:      General: No focal deficit present.      Mental Status: She is alert and oriented to person, place, and time.   Psychiatric:         Mood and Affect: Mood normal.         Behavior: Behavior normal.          Office Visit on 07/23/2024   Component Date Value Ref Range Status    Sodium 07/23/2024 139  136 - 145 mmol/L Final    Potassium 07/23/2024 4.4  3.5 - 5.1 mmol/L Final    Chloride 07/23/2024 108  98 - 110 mmol/L Final    CO2 07/23/2024 25  21 - 32 mmol/L Final    Glucose 07/23/2024 101  70 - 115 mg/dL Final    Blood Urea Nitrogen 07/23/2024 19  7.0 - 20.0 mg/dL Final    Creatinine 07/23/2024 0.76  0.66 - 1.25 mg/dL Final    Calcium 07/23/2024 9.9  8.4 - 10.2 mg/dL Final    Protein Total 07/23/2024 7.1  6.3 - 8.2 gm/dL Final    Albumin 07/23/2024 4.3  3.4 - 5.0 g/dL Final    Globulin 07/23/2024 2.8  2.0 - 3.9 gm/dL Final     Albumin/Globulin Ratio 07/23/2024 1.5  ratio Final    Bilirubin Total 07/23/2024 1.1 (H)  0.0 - 1.0 mg/dL Final    ALP 07/23/2024 74  50 - 144 unit/L Final    ALT 07/23/2024 30  1 - 45 unit/L Final    AST 07/23/2024 30  14 - 36 unit/L Final    eGFR 07/23/2024 84  mL/min/1.73/m2 Final                         EGFR INTERPRETATION    Beginning 8/15/22 we are reporting the eGFRcr calculation as recommended by the [employer]. The eGFRcr equation has similar overall performance characteristics to the older equation, but the values may differ by more than 10% particularly at higher values of eGFRcr and younger adult ages.    NKF stages of chronic kidney disease (CKD)  Stage 1: Kidney damage with normal or increased eGFR (>90 mL/min/1.73 m^2)  Stage 2: Mild reduction in GFR (60-89 mL/min/1.73 m^2)  Stage 3a: Moderate reduction in GFR (45-59 mL/min/1.73 m^2)  Stage 3b: Moderate reduction in GFR (30-44 mL/min/1.73 m^2)  Stage 4: Severe reduction in GFR (15-29 mL/min/1.73 m^2)  Stage 5: Kidney failure (GFR <15 mL/min/1.73 m^2)        Anion Gap 07/23/2024 6.0  2.0 - 13.0 mEq/L Final    BUN/Creatinine Ratio 07/23/2024 25 (H)  12 - 20 Final    Cholesterol Total 07/23/2024 257 (H)  0 - 200 mg/dL Final    HDL Cholesterol 07/23/2024 71 (H)  40 - 60 mg/dL Final    Triglyceride 07/23/2024 153  30 - 200 mg/dL Final    LDL Cholesterol Direct 07/23/2024 140.0 (H)  30.0 - 100.0 mg/dL Final    WBC 07/23/2024 3.82 (L)  4.00 - 11.50 x10(3)/mcL Final    RBC 07/23/2024 5.14 (H)  4.00 - 5.10 x10(6)/mcL Final    Hgb 07/23/2024 14.7  11.8 - 16.0 g/dL Final    Hct 07/23/2024 44.5  36.0 - 48.0 % Final    MCV 07/23/2024 86.6  79.0 - 99.0 fL Final    MCH 07/23/2024 28.6  27.0 - 34.0 pg Final    MCHC 07/23/2024 33.0  31.0 - 37.0 g/dL Final    RDW 07/23/2024 12.7  11.0 - 14.5 % Final    Platelet 07/23/2024 303  140 - 371 x10(3)/mcL Final    MPV 07/23/2024 9.6  9.4 - 12.4 fL Final    Neut % 07/23/2024 54.6  37 - 73 % Final    Lymph %  07/23/2024 34.6  20 - 55 % Final    Mono % 07/23/2024 6.3  4.7 - 12.5 % Final    Eos % 07/23/2024 3.7  0.7 - 7 % Final    Basophil % 07/23/2024 0.8  0.1 - 1.2 % Final    Lymph # 07/23/2024 1.32  1.16 - 3.74 x10(3)/mcL Final    Neut # 07/23/2024 2.09  1.56 - 6.13 x10(3)/mcL Final    Mono # 07/23/2024 0.24  0.24 - 0.36 x10(3)/mcL Final    Eos # 07/23/2024 0.14  0.04 - 0.36 x10(3)/mcL Final    Baso # 07/23/2024 0.03  0.01 - 0.08 x10(3)/mcL Final    IG# 07/23/2024 0.00 (L)  0.0001 - 0.031 x10(3)/mcL Final    IG% 07/23/2024 0.0  0 - 0.5 % Final          ASSESSMENT/PLAN:    1. Vitamin D deficiency  Assessment & Plan:  Taking 1000 daily. The current medical regimen is effective;  continue present plan and medications.      Orders:  -     Vitamin D    2. Hypertension, unspecified type  Assessment & Plan:  Cozaar 50 mg daily. The current medical regimen is effective;  continue present plan and medications.      Orders:  -     CBC Auto Differential  -     Comprehensive Metabolic Panel    3. Pelvic fullness in female  Assessment & Plan:  Instructed patient to check with her insurance company to find which gyn would actually be covered on her insurance as the last ones have not been.  Strongly encouraged to return for follow-up with pelvic exam with current problems to monitor for any worsening or complications      4. Breast cancer screening by mammogram  Assessment & Plan:  Patient is past due for her mammogram ointment she prefers to have it at [facility] we will send referral as requested.    Orders:  -     Mammo Digital Screening Bilat w/ [facility]; Future; Expected date: 07/23/2024    5. Hyperlipidemia LDL goal <130  Assessment & Plan:  Patient with hypertension history and history of elevated cholesterol.  Declines any statins at this time discussed low-fat low-cholesterol diet but agreed to testing and if it is more elevated patient will consider potential treatment.    Orders:  -     Lipid Panel          Recent Results (from  the past 24 hour(s))   Comprehensive Metabolic Panel    Collection Time: 07/23/24  9:38 AM   Result Value Ref Range    Sodium 139 136 - 145 mmol/L    Potassium 4.4 3.5 - 5.1 mmol/L    Chloride 108 98 - 110 mmol/L    CO2 25 21 - 32 mmol/L    Glucose 101 70 - 115 mg/dL    Blood Urea Nitrogen 19 7.0 - 20.0 mg/dL    Creatinine 0.76 0.66 - 1.25 mg/dL    Calcium 9.9 8.4 - 10.2 mg/dL    Protein Total 7.1 6.3 - 8.2 gm/dL    Albumin 4.3 3.4 - 5.0 g/dL    Globulin 2.8 2.0 - 3.9 gm/dL    Albumin/Globulin Ratio 1.5 ratio    Bilirubin Total 1.1 (H) 0.0 - 1.0 mg/dL    ALP 74 50 - 144 unit/L    ALT 30 1 - 45 unit/L    AST 30 14 - 36 unit/L    eGFR 84 mL/min/1.73/m2    Anion Gap 6.0 2.0 - 13.0 mEq/L    BUN/Creatinine Ratio 25 (H) 12 - 20   Lipid Panel    Collection Time: 07/23/24  9:38 AM   Result Value Ref Range    Cholesterol Total 257 (H) 0 - 200 mg/dL    HDL Cholesterol 71 (H) 40 - 60 mg/dL    Triglyceride 153 30 - 200 mg/dL    LDL Cholesterol Direct 140.0 (H) 30.0 - 100.0 mg/dL   CBC with Differential    Collection Time: 07/23/24  9:38 AM   Result Value Ref Range    WBC 3.82 (L) 4.00 - 11.50 x10(3)/mcL    RBC 5.14 (H) 4.00 - 5.10 x10(6)/mcL    Hgb 14.7 11.8 - 16.0 g/dL    Hct 44.5 36.0 - 48.0 %    MCV 86.6 79.0 - 99.0 fL    MCH 28.6 27.0 - 34.0 pg    MCHC 33.0 31.0 - 37.0 g/dL    RDW 12.7 11.0 - 14.5 %    Platelet 303 140 - 371 x10(3)/mcL    MPV 9.6 9.4 - 12.4 fL    Neut % 54.6 37 - 73 %    Lymph % 34.6 20 - 55 %    Mono % 6.3 4.7 - 12.5 %    Eos % 3.7 0.7 - 7 %    Basophil % 0.8 0.1 - 1.2 %    Lymph # 1.32 1.16 - 3.74 x10(3)/mcL    Neut # 2.09 1.56 - 6.13 x10(3)/mcL    Mono # 0.24 0.24 - 0.36 x10(3)/mcL    Eos # 0.14 0.04 - 0.36 x10(3)/mcL    Baso # 0.03 0.01 - 0.08 x10(3)/mcL    IG# 0.00 (L) 0.0001 - 0.031 x10(3)/mcL    IG% 0.0 0 - 0.5 %       Follow Up:  Follow up in about 6 months (around 1/23/2025).      Discussed the diagnosis, prognosis, plan of care, chronic nature of and indications for, risks and benefits of  treatment for conditions.  Continue all medications as prescribed unless otherwise noted.   Call if patient develops other symptoms or if not better in 2-3 days and sooner if worse. Emphasized the importance of compliance with all recommendations, including medication use and timely follow up. Instructed to return as noted be or sooner if patient develops any other problems or if there are any other additional questions or concerns.       Disclaimer:  This note is prepared using voice recognition software and as such is likely to have errors despite attempts at proofreading. Please contact me for questions.

## 2024-07-23 NOTE — ASSESSMENT & PLAN NOTE
Patient with hypertension history and history of elevated cholesterol.  Declines any statins at this time discussed low-fat low-cholesterol diet but agreed to testing and if it is more elevated patient will consider potential treatment.

## 2024-07-23 NOTE — ASSESSMENT & PLAN NOTE
Instructed patient to check with her insurance company to find which gyn would actually be covered on her insurance as the last ones have not been.  Strongly encouraged to return for follow-up with pelvic exam with current problems to monitor for any worsening or complications

## 2024-07-23 NOTE — ASSESSMENT & PLAN NOTE
Patient is past due for her mammogram ointment she prefers to have it at [place] we will send referral as requested.

## 2024-07-23 NOTE — ASSESSMENT & PLAN NOTE
Taking 1000 daily. The current medical regimen is effective;  continue present plan and medications.

## 2024-07-24 ENCOUNTER — TELEPHONE (OUTPATIENT)
Dept: FAMILY MEDICINE | Facility: CLINIC | Age: 69
End: 2024-07-24
Payer: MEDICARE

## 2024-07-24 DIAGNOSIS — E78.5 HYPERLIPIDEMIA, UNSPECIFIED HYPERLIPIDEMIA TYPE: Primary | ICD-10-CM

## 2024-07-24 LAB — 25(OH)D3+25(OH)D2 SERPL-MCNC: 45 NG/ML (ref 30–80)

## 2024-07-24 RX ORDER — EZETIMIBE 10 MG/1
10 TABLET ORAL DAILY
Qty: 90 TABLET | Refills: 3 | Status: SHIPPED | OUTPATIENT
Start: 2024-07-24 | End: 2025-07-24

## 2024-07-24 NOTE — TELEPHONE ENCOUNTER
----- Message from Maggi Santana DNP sent at 7/24/2024  6:31 AM CDT -----  Patient does not want to take statin but ask her 1 more time if she would be willing to try taking Zetia 10 mg to help her cholesterol it is not statin.  Slight drop in WBC this could be a viral infection but recheck CBC, vitamin-D CMP FLP in six-month to ensure that it does returned to normal.  Lab otherwise normal.

## 2025-01-04 DIAGNOSIS — I10 HYPERTENSION, UNSPECIFIED TYPE: ICD-10-CM

## 2025-01-06 RX ORDER — LOSARTAN POTASSIUM 50 MG/1
50 TABLET ORAL
Qty: 90 TABLET | Refills: 1 | Status: SHIPPED | OUTPATIENT
Start: 2025-01-06

## 2025-03-12 ENCOUNTER — RESULTS FOLLOW-UP (OUTPATIENT)
Dept: FAMILY MEDICINE | Facility: CLINIC | Age: 70
End: 2025-03-12

## 2025-03-12 ENCOUNTER — OFFICE VISIT (OUTPATIENT)
Dept: FAMILY MEDICINE | Facility: CLINIC | Age: 70
End: 2025-03-12
Payer: MEDICARE

## 2025-03-12 VITALS
TEMPERATURE: 97 F | RESPIRATION RATE: 20 BRPM | OXYGEN SATURATION: 95 % | HEIGHT: 65 IN | DIASTOLIC BLOOD PRESSURE: 82 MMHG | BODY MASS INDEX: 26.49 KG/M2 | WEIGHT: 159 LBS | SYSTOLIC BLOOD PRESSURE: 128 MMHG | HEART RATE: 95 BPM

## 2025-03-12 DIAGNOSIS — I10 HYPERTENSION, UNSPECIFIED TYPE: ICD-10-CM

## 2025-03-12 DIAGNOSIS — Z13.820 OSTEOPOROSIS SCREENING: ICD-10-CM

## 2025-03-12 DIAGNOSIS — Z12.31 BREAST CANCER SCREENING BY MAMMOGRAM: ICD-10-CM

## 2025-03-12 DIAGNOSIS — E28.39 DECREASED ESTROGEN LEVEL: ICD-10-CM

## 2025-03-12 DIAGNOSIS — E78.5 HYPERLIPIDEMIA, UNSPECIFIED HYPERLIPIDEMIA TYPE: ICD-10-CM

## 2025-03-12 DIAGNOSIS — I10 PRIMARY HYPERTENSION: ICD-10-CM

## 2025-03-12 DIAGNOSIS — T14.8XXA ABRASION: ICD-10-CM

## 2025-03-12 DIAGNOSIS — Z71.89 COUNSELING REGARDING ADVANCED DIRECTIVES AND GOALS OF CARE: ICD-10-CM

## 2025-03-12 DIAGNOSIS — S40.812A ABRASION OF LEFT UPPER EXTREMITY, INITIAL ENCOUNTER: ICD-10-CM

## 2025-03-12 DIAGNOSIS — E78.5 HYPERLIPIDEMIA LDL GOAL <130: ICD-10-CM

## 2025-03-12 DIAGNOSIS — E55.9 VITAMIN D DEFICIENCY: ICD-10-CM

## 2025-03-12 DIAGNOSIS — Z00.00 MEDICARE ANNUAL WELLNESS VISIT, SUBSEQUENT: Primary | ICD-10-CM

## 2025-03-12 LAB
ALBUMIN SERPL-MCNC: 4.5 G/DL (ref 3.4–5)
ALBUMIN/GLOB SERPL: 1.7 RATIO
ALP SERPL-CCNC: 66 UNIT/L (ref 50–144)
ALT SERPL-CCNC: 31 UNIT/L (ref 1–45)
ANION GAP SERPL CALC-SCNC: 4 MEQ/L (ref 2–13)
AST SERPL-CCNC: 27 UNIT/L (ref 14–36)
BASOPHILS # BLD AUTO: 0.03 X10(3)/MCL (ref 0.01–0.08)
BASOPHILS NFR BLD AUTO: 0.8 % (ref 0.1–1.2)
BILIRUB SERPL-MCNC: 1.2 MG/DL (ref 0–1)
BUN SERPL-MCNC: 17 MG/DL (ref 7–20)
CALCIUM SERPL-MCNC: 9.5 MG/DL (ref 8.4–10.2)
CHLORIDE SERPL-SCNC: 105 MMOL/L (ref 98–110)
CHOLEST SERPL-MCNC: 203 MG/DL (ref 0–200)
CO2 SERPL-SCNC: 31 MMOL/L (ref 21–32)
CREAT SERPL-MCNC: 0.85 MG/DL (ref 0.66–1.25)
CREAT/UREA NIT SERPL: 20 (ref 12–20)
EOSINOPHIL # BLD AUTO: 0.17 X10(3)/MCL (ref 0.04–0.36)
EOSINOPHIL NFR BLD AUTO: 4.3 % (ref 0.7–7)
ERYTHROCYTE [DISTWIDTH] IN BLOOD BY AUTOMATED COUNT: 12.8 % (ref 11–14.5)
GFR SERPLBLD CREATININE-BSD FMLA CKD-EPI: 74 ML/MIN/1.73/M2
GLOBULIN SER-MCNC: 2.6 GM/DL (ref 2–3.9)
GLUCOSE SERPL-MCNC: 90 MG/DL (ref 70–115)
HCT VFR BLD AUTO: 44.5 % (ref 36–48)
HDLC SERPL-MCNC: 77 MG/DL (ref 40–60)
HGB BLD-MCNC: 14.8 G/DL (ref 11.8–16)
IMM GRANULOCYTES # BLD AUTO: 0.01 X10(3)/MCL (ref 0–0.03)
IMM GRANULOCYTES NFR BLD AUTO: 0.3 % (ref 0–0.5)
LDLC SERPL DIRECT ASSAY-SCNC: 104.5 MG/DL (ref 30–100)
LYMPHOCYTES # BLD AUTO: 1.66 X10(3)/MCL (ref 1.16–3.74)
LYMPHOCYTES NFR BLD AUTO: 41.6 % (ref 20–55)
MCH RBC QN AUTO: 29.2 PG (ref 27–34)
MCHC RBC AUTO-ENTMCNC: 33.3 G/DL (ref 31–37)
MCV RBC AUTO: 87.8 FL (ref 79–99)
MONOCYTES # BLD AUTO: 0.29 X10(3)/MCL (ref 0.24–0.36)
MONOCYTES NFR BLD AUTO: 7.3 % (ref 4.7–12.5)
NEUTROPHILS # BLD AUTO: 1.83 X10(3)/MCL (ref 1.56–6.13)
NEUTROPHILS NFR BLD AUTO: 45.7 % (ref 37–73)
NRBC BLD AUTO-RTO: 0 %
PLATELET # BLD AUTO: 342 X10(3)/MCL (ref 140–371)
PMV BLD AUTO: 9.4 FL (ref 9.4–12.4)
POTASSIUM SERPL-SCNC: 4.2 MMOL/L (ref 3.5–5.1)
PROT SERPL-MCNC: 7.1 GM/DL (ref 6.3–8.2)
RBC # BLD AUTO: 5.07 X10(6)/MCL (ref 4–5.1)
SODIUM SERPL-SCNC: 140 MMOL/L (ref 136–145)
TRIGL SERPL-MCNC: 100 MG/DL (ref 30–200)
WBC # BLD AUTO: 3.99 X10(3)/MCL (ref 4–11.5)

## 2025-03-12 PROCEDURE — 1159F MED LIST DOCD IN RCRD: CPT | Mod: ,,, | Performed by: NURSE PRACTITIONER

## 2025-03-12 PROCEDURE — 90471 IMMUNIZATION ADMIN: CPT | Mod: ,,, | Performed by: NURSE PRACTITIONER

## 2025-03-12 PROCEDURE — 3075F SYST BP GE 130 - 139MM HG: CPT | Mod: ,,, | Performed by: NURSE PRACTITIONER

## 2025-03-12 PROCEDURE — 4010F ACE/ARB THERAPY RXD/TAKEN: CPT | Mod: ,,, | Performed by: NURSE PRACTITIONER

## 2025-03-12 PROCEDURE — 85025 COMPLETE CBC W/AUTO DIFF WBC: CPT | Performed by: NURSE PRACTITIONER

## 2025-03-12 PROCEDURE — 1160F RVW MEDS BY RX/DR IN RCRD: CPT | Mod: ,,, | Performed by: NURSE PRACTITIONER

## 2025-03-12 PROCEDURE — G0439 PPPS, SUBSEQ VISIT: HCPCS | Mod: ,,, | Performed by: NURSE PRACTITIONER

## 2025-03-12 PROCEDURE — 80061 LIPID PANEL: CPT | Performed by: NURSE PRACTITIONER

## 2025-03-12 PROCEDURE — 3008F BODY MASS INDEX DOCD: CPT | Mod: ,,, | Performed by: NURSE PRACTITIONER

## 2025-03-12 PROCEDURE — 1158F ADVNC CARE PLAN TLK DOCD: CPT | Mod: ,,, | Performed by: NURSE PRACTITIONER

## 2025-03-12 PROCEDURE — 3080F DIAST BP >= 90 MM HG: CPT | Mod: ,,, | Performed by: NURSE PRACTITIONER

## 2025-03-12 PROCEDURE — 80053 COMPREHEN METABOLIC PANEL: CPT | Performed by: NURSE PRACTITIONER

## 2025-03-12 PROCEDURE — 90714 TD VACC NO PRESV 7 YRS+ IM: CPT | Mod: ,,, | Performed by: NURSE PRACTITIONER

## 2025-03-12 PROCEDURE — 82306 VITAMIN D 25 HYDROXY: CPT | Performed by: NURSE PRACTITIONER

## 2025-03-12 RX ORDER — EZETIMIBE 10 MG/1
10 TABLET ORAL DAILY
Qty: 90 TABLET | Refills: 1 | Status: SHIPPED | OUTPATIENT
Start: 2025-03-12

## 2025-03-12 RX ORDER — LOSARTAN POTASSIUM 50 MG/1
50 TABLET ORAL 2 TIMES DAILY
Qty: 90 TABLET | Refills: 1 | Status: SHIPPED | OUTPATIENT
Start: 2025-03-12

## 2025-03-12 NOTE — ASSESSMENT & PLAN NOTE
Taking 1000 daily Will notify with results of lab draw when available. Continue current treatment until results available.

## 2025-03-12 NOTE — ASSESSMENT & PLAN NOTE
Declines any statins at this time discussed low-fat low-cholesterol diet. Zetia 10 mg (as patient refuses all statins, first line defense in medications). Will notify with results of lab draw when available. Continue current treatment until results available.

## 2025-03-12 NOTE — ASSESSMENT & PLAN NOTE
Mother history breast cancer, normal breast exam. The current medical regimen is effective;  continue present plan and medications.

## 2025-03-12 NOTE — ASSESSMENT & PLAN NOTE
Health Maintenance   Topic Date Due    TETANUS VACCINE  Never done    Shingles Vaccine (1 of 2) Never done    Mammogram  12/02/2023    Influenza Vaccine (1) Never done    COVID-19 Vaccine (4 - 2024-25 season) 09/01/2024    DEXA Scan  Never done    Cervical Cancer Screening  06/28/2025    Colorectal Cancer Screening  06/07/2026    Hemoglobin A1c (Diabetic Prevention Screening)  10/30/2026    Lipid Panel  07/23/2029    Hepatitis C Screening  Completed    RSV Vaccine (Age 60+ and Pregnant patients)  Discontinued    Pneumococcal Vaccines (Age 50+)  Discontinued   Refuses all vaccines due: tetanus, shingles.

## 2025-03-12 NOTE — ASSESSMENT & PLAN NOTE
Cozaar 50 mg daily. The current medical regimen is effective;  continue present plan and medications.

## 2025-03-12 NOTE — PROGRESS NOTES
[facility]      Patient ID: [id number]     Chief Complaint: Medicare Annual Wellness     HPI:     Raul Rainey is a 69 y.o. female here today for a Medicare Annual Wellness visit and comprehensive Health Risk Assessment.     A separate E/M code has been provided to evaluate additional complaints that the patient would like addressed during the dedicated Medicare Wellness Exam.    HPI   Returns for wellness visit has been trying to eat healthy and exercising daily.  Feeling well but had not schedule the mammogram or the bone density but is willing to do it time and her mother has a history of breast cancer.  Has been noticing that her blood pressure has been increasing though taking the medication 1 time she did take an extra half a pill and it brought the blood pressure down to 160s but still was having headaches and other symptoms.  Times chest pain no other problems she is resting well does have some arthritis but it is not debilitating.  Health Maintenance   Topic Date Due    TETANUS VACCINE  Never done    Shingles Vaccine (1 of 2) Never done    Mammogram  12/02/2023    Influenza Vaccine (1) Never done    COVID-19 Vaccine (4 - 2024-25 season) 09/01/2024    DEXA Scan  Never done    Cervical Cancer Screening  06/28/2025    Colorectal Cancer Screening  06/07/2026    Hemoglobin A1c (Diabetic Prevention Screening)  10/30/2026    Lipid Panel  07/23/2029    Hepatitis C Screening  Completed    RSV Vaccine (Age 60+ and Pregnant patients)  Discontinued    Pneumococcal Vaccines (Age 50+)  Discontinued        Past Medical History:   Diagnosis Date    Hypertension         Past Surgical History:   Procedure Laterality Date    orthopedic sx  Left     wrist        Social History     Socioeconomic History    Marital status:    Tobacco Use    Smoking status: Never    Smokeless tobacco: Never   Substance and Sexual Activity    Alcohol use: Yes    Drug use: Never     Social Drivers of Health     Financial  Resource Strain: Low Risk  (3/10/2025)    Overall Financial Resource Strain (CARDIA)     Difficulty of Paying Living Expenses: Not hard at all   Food Insecurity: No Food Insecurity (3/10/2025)    Hunger Vital Sign     Worried About Running Out of Food in the Last Year: Never true     Ran Out of Food in the Last Year: Never true   Transportation Needs: No Transportation Needs (3/10/2025)    PRAPARE - Transportation     Lack of Transportation (Medical): No     Lack of Transportation (Non-Medical): No   Physical Activity: Sufficiently Active (3/10/2025)    Exercise Vital Sign     Days of Exercise per Week: 4 days     Minutes of Exercise per Session: 60 min   Stress: No Stress Concern Present (3/10/2025)    [employer] [place] [employer] - Occupational Stress Questionnaire     Feeling of Stress : Only a little   Housing Stability: Low Risk  (3/10/2025)    Housing Stability Vital Sign     Unable to Pay for Housing in the Last Year: No     Number of Times Moved in the Last Year: 0     Homeless in the Last Year: No        Family History   Problem Relation Name Age of Onset    Cancer Mother      Heart disease Father      Hypertension Father          Current Outpatient Medications   Medication Instructions    ezetimibe (ZETIA) 10 mg, Oral, Daily    losartan (COZAAR) 50 mg, Oral, 2 times daily, Dose increase       Review of patient's allergies indicates:  No Known Allergies     Immunization History   Administered Date(s) Administered    COVID-19, MRNA, LN-S, PF (Pfizer) (Purple Cap) 05/17/2021, 06/07/2021, 12/21/2021        Patient Care Team:  Maggi Santana DNP as PCP - General (Family Medicine)  Maggi Santana DNP (Family Medicine)    Subjective:     Review of Systems   Constitutional:  Negative for activity change and unexpected weight change.   HENT:  Positive for rhinorrhea. Negative for hearing loss and trouble swallowing.    Eyes:  Negative for discharge and visual disturbance.   Respiratory:  Negative for  "chest tightness and wheezing.    Cardiovascular:  Negative for chest pain and palpitations.   Gastrointestinal:  Negative for blood in stool, constipation, diarrhea and vomiting.   Endocrine: Negative for polydipsia and polyuria.   Genitourinary:  Negative for difficulty urinating, dysuria, hematuria and menstrual problem.   Musculoskeletal:  Negative for arthralgias, joint swelling and neck pain.   Neurological:  Positive for headaches. Negative for weakness.   Psychiatric/Behavioral:  Negative for confusion and dysphoric mood.        12 point review of systems conducted, negative except as stated in the history of present illness. See HPI for details.    Objective:     Visit Vitals  BP (!) 138/90 (BP Location: Right arm, Patient Position: Sitting)   Pulse 95   Temp 97 °F (36.1 °C)   Resp 20   Ht 5' 5" (1.651 m)   Wt 72.1 kg (159 lb)   SpO2 95%   BMI 26.46 kg/m²       Physical Exam  Vitals and nursing note reviewed.   Constitutional:       General: She is not in acute distress.     Appearance: She is not ill-appearing.   HENT:      Head: Normocephalic and atraumatic.      Mouth/Throat:      Mouth: Mucous membranes are moist.      Pharynx: Oropharynx is clear.   Eyes:      General: No scleral icterus.     Extraocular Movements: Extraocular movements intact.      Conjunctiva/sclera: Conjunctivae normal.      Pupils: Pupils are equal, round, and reactive to light.   Neck:      Vascular: No carotid bruit.   Cardiovascular:      Rate and Rhythm: Normal rate and regular rhythm.      Heart sounds: No murmur heard.     No friction rub. No gallop.   Pulmonary:      Effort: Pulmonary effort is normal. No respiratory distress.      Breath sounds: Normal breath sounds. No wheezing, rhonchi or rales.   Chest:          Comments: thckening  Abdominal:      General: Abdomen is flat. Bowel sounds are normal. There is no distension.      Palpations: Abdomen is soft. There is no mass.      Tenderness: There is no abdominal " tenderness.   Musculoskeletal:         General: Normal range of motion.      Cervical back: Normal range of motion and neck supple.   Skin:     General: Skin is warm and dry.   Neurological:      General: No focal deficit present.      Mental Status: She is alert.   Psychiatric:         Mood and Affect: Mood normal.         Labs Reviewed:  Chemistry:  Lab Results   Component Value Date     07/23/2024    K 4.4 07/23/2024    BUN 19 07/23/2024    CREATININE 0.76 07/23/2024    EGFRNORACEVR 84 07/23/2024    GLUCOSE 101 07/23/2024    CALCIUM 9.9 07/23/2024    ALKPHOS 74 07/23/2024    LABPROT 7.1 07/23/2024    ALBUMIN 4.3 07/23/2024    AST 30 07/23/2024    ALT 30 07/23/2024    LNEKZVTU20LZ 45 07/23/2024    TSH 0.487 10/30/2023        Lab Results   Component Value Date    HGBA1C 5.3 10/30/2023        Hematology:  Lab Results   Component Value Date    WBC 3.82 (L) 07/23/2024    RBC 5.14 (H) 07/23/2024    HGB 14.7 07/23/2024    HCT 44.5 07/23/2024    MCV 86.6 07/23/2024    MCH 28.6 07/23/2024    MCHC 33.0 07/23/2024    RDW 12.7 07/23/2024     07/23/2024    MPV 9.6 07/23/2024       Lipid Panel:  Lab Results   Component Value Date    CHOL 257 (H) 07/23/2024    HDL 71 (H) 07/23/2024    TRIG 153 07/23/2024        Assessment:       ICD-10-CM ICD-9-CM   1. Medicare annual wellness visit, subsequent  Z00.00 V70.0   2. Breast cancer screening by mammogram  Z12.31 V76.12   3. Osteoporosis screening  Z13.820 V82.81   4. Vitamin D deficiency  E55.9 268.9   5. Counseling regarding advanced directives and goals of care  Z71.89 V65.49   6. Hyperlipidemia LDL goal <130  E78.5 272.4   7. Primary hypertension  I10 401.9   8. Decreased estrogen level  E28.39 256.39   9. Hypertension, unspecified type  I10 401.9   10. Hyperlipidemia, unspecified hyperlipidemia type  E78.5 272.4        Plan:     1. Medicare annual wellness visit, subsequent  Assessment & Plan:  Health Maintenance   Topic Date Due    TETANUS VACCINE  Never done     Shingles Vaccine (1 of 2) Never done    Mammogram  12/02/2023    Influenza Vaccine (1) Never done    COVID-19 Vaccine (4 - 2024-25 season) 09/01/2024    DEXA Scan  Never done    Cervical Cancer Screening  06/28/2025    Colorectal Cancer Screening  06/07/2026    Hemoglobin A1c (Diabetic Prevention Screening)  10/30/2026    Lipid Panel  07/23/2029    Hepatitis C Screening  Completed    RSV Vaccine (Age 60+ and Pregnant patients)  Discontinued    Pneumococcal Vaccines (Age 50+)  Discontinued   Refuses all vaccines due: tetanus, shingles.        2. Breast cancer screening by mammogram  Assessment & Plan:  Mother history breast cancer, normal breast exam. The current medical regimen is effective;  continue present plan and medications.      Orders:  -     Cancel: Mammo Digital Screening Bilat w/ Nino (XPD); Future; Expected date: 03/12/2025  -     Mammo Digital Screening Bilat w/ Nino (XPD); Future; Expected date: 03/12/2025    3. Osteoporosis screening  -     Cancel: DXA Bone Density Axial Skeleton 1 or more sites; Future; Expected date: 03/12/2025  -     DXA Bone Density Axial Skeleton 1 or more sites; Future; Expected date: 03/12/2025    4. Vitamin D deficiency  Assessment & Plan:  Taking 1000 daily Will notify with results of lab draw when available. Continue current treatment until results available.       Orders:  -     Vitamin D    5. Counseling regarding advanced directives and goals of care  Assessment & Plan:  Advance Care Planning discuss 5 rights booklet      6. Hyperlipidemia LDL goal <130  Assessment & Plan:  Declines any statins at this time discussed low-fat low-cholesterol diet. Zetia 10 mg (as patient refuses all statins, first line defense in medications). Will notify with results of lab draw when available. Continue current treatment until results available.       Orders:  -     CBC Auto Differential  -     Comprehensive Metabolic Panel  -     Lipid Panel    7. Primary hypertension  Assessment &  Plan:  Cozaar 50 mg daily. The current medical regimen is effective;  continue present plan and medications.       8. Decreased estrogen level  -     Cancel: DXA Bone Density Axial Skeleton 1 or more sites; Future; Expected date: 03/12/2025  -     DXA Bone Density Axial Skeleton 1 or more sites; Future; Expected date: 03/12/2025    9. Hypertension, unspecified type  Assessment & Plan:  Cozaar 50 mg daily. The current medical regimen is effective;  continue present plan and medications.     Orders:  -     losartan (COZAAR) 50 MG tablet; Take 1 tablet (50 mg total) by mouth 2 (two) times a day. Dose increase  Dispense: 90 tablet; Refill: 1    10. Hyperlipidemia, unspecified hyperlipidemia type  -     ezetimibe (ZETIA) 10 mg tablet; Take 1 tablet (10 mg total) by mouth once daily.  Dispense: 90 tablet; Refill: 1         The following assessments were completed and reviewed. See completed screening forms and assessments within the Encounter Summary.   [x] Health Risk Assessment   [x] CVD Risk Factors   [x] Obesity/Physical Activity -  Encouraged daily 30 minute physical activity x 5 days per week.   [x] Home Safety/Living Situation   [x] Alcohol Screen  [x] Depression (PHQ) Screen   [x] Timed Get Up and Go   [x] Whisper Test  [x] Cognitive Function/Impairment Screen   [x] Nutrition Screening  [x] ADL Screen   [x] Opioid Screen:  [x] Patient does not have a prescription for opioids.   [] Patient has a prescription for opioids but is at low risk for abuse.   [x] Substance Abuse Screen:   [x] Patient does not use substances.   [] Patient screens positive for substance use disorder.   Advance Care Planning     Date: 03/12/2025    Living Will  During this visit, I engaged the patient  in the voluntary advance care planning process.  The patient and I reviewed the role for advance directives and their purpose in directing future healthcare if the patient's unable to speak for him/herself.  At this point in time, the patient  does have full decision-making capacity.  We discussed different extreme health states that she could experience, and reviewed what kind of medical care she would want in those situations.  The patient communicated that if she were comatose and had little chance of a meaningful recovery, she would want machines/life-sustaining treatments used. In addition to the above preference, other important end-of-life issues for the patient include code at this time but needs to consider more thoroughly.. .  I spent a total of 10 minutes engaging the patient in this advance care planning discussion.             I attest to discussing Advance Care Planning with patient and/or family member.  Education was provided including the importance of the Health Care Power of , Advance Directives, and/or [place] documentation.  The patient expressed understanding to the importance of this information and discussion.    Provided patient with a 5-10 year written screening schedule and personal prevention plan. Recommendations were developed using the USPSTF age appropriate recommendations. Education, counseling, and referrals were provided as needed. After Visit Summary printed and given to patient, which includes a list of additional screenings\tests needed.    Follow up in about 6 months (around 9/12/2025). In addition to their scheduled follow up, the patient has also been instructed to follow up on as needed basis.     No future appointments.     Maggi Santana, DNP

## 2025-03-13 ENCOUNTER — TELEPHONE (OUTPATIENT)
Dept: FAMILY MEDICINE | Facility: CLINIC | Age: 70
End: 2025-03-13
Payer: MEDICARE

## 2025-03-13 LAB — 25(OH)D3+25(OH)D2 SERPL-MCNC: 32 NG/ML (ref 30–80)

## 2025-03-13 NOTE — TELEPHONE ENCOUNTER
----- Message from Maggi Santana DNP sent at 3/12/2025  4:36 PM CDT -----  Notify improving cholesterol with zetia continue, recheck recheck cmp, flp in 6 months, other lab normal.   ----- Message -----  From: Lab, Background User  Sent: 3/12/2025   3:38 PM CDT  To: Maggi Santana DNP

## 2025-04-10 ENCOUNTER — TELEPHONE (OUTPATIENT)
Dept: FAMILY MEDICINE | Facility: CLINIC | Age: 70
End: 2025-04-10
Payer: MEDICARE

## 2025-04-10 NOTE — TELEPHONE ENCOUNTER
----- Message from Veronica sent at 3/12/2025 10:02 AM CDT -----  Call Patient to see how blood pressure is doing  ----- Message -----  From: Veronica Grace  Sent: 4/10/2025   7:00 AM CDT  To:     Call Patient to see how blood pressure is doing

## 2025-04-10 NOTE — TELEPHONE ENCOUNTER
Patient states blood pressure has been running 140-150/70-80's. States compliance with medication.

## 2025-04-18 LAB — BMD RECOMMENDATION EXT: NORMAL

## 2025-04-21 ENCOUNTER — DOCUMENTATION ONLY (OUTPATIENT)
Dept: FAMILY MEDICINE | Facility: CLINIC | Age: 70
End: 2025-04-21
Payer: MEDICARE

## 2025-04-21 LAB — BCS RECOMMENDATION EXT: NORMAL

## 2025-05-05 ENCOUNTER — DOCUMENTATION ONLY (OUTPATIENT)
Dept: FAMILY MEDICINE | Facility: CLINIC | Age: 70
End: 2025-05-05
Payer: MEDICARE

## 2025-05-05 PROBLEM — Z78.0 OSTEOPENIA AFTER MENOPAUSE: Status: ACTIVE | Noted: 2025-05-05

## 2025-05-05 PROBLEM — M85.80 OSTEOPENIA AFTER MENOPAUSE: Status: ACTIVE | Noted: 2025-05-05

## 2025-06-13 DIAGNOSIS — I10 HYPERTENSION, UNSPECIFIED TYPE: ICD-10-CM

## 2025-06-16 RX ORDER — LOSARTAN POTASSIUM 50 MG/1
50 TABLET ORAL 2 TIMES DAILY
Qty: 90 TABLET | Refills: 1 | Status: SHIPPED | OUTPATIENT
Start: 2025-06-16

## 2025-08-07 DIAGNOSIS — E78.5 HYPERLIPIDEMIA, UNSPECIFIED HYPERLIPIDEMIA TYPE: ICD-10-CM

## 2025-08-07 RX ORDER — EZETIMIBE 10 MG/1
10 TABLET ORAL
Qty: 90 TABLET | Refills: 1 | Status: SHIPPED | OUTPATIENT
Start: 2025-08-07

## 2025-08-20 ENCOUNTER — OFFICE VISIT (OUTPATIENT)
Dept: URGENT CARE | Facility: CLINIC | Age: 70
End: 2025-08-20
Payer: MEDICARE

## 2025-08-20 VITALS
TEMPERATURE: 98 F | DIASTOLIC BLOOD PRESSURE: 87 MMHG | WEIGHT: 157 LBS | SYSTOLIC BLOOD PRESSURE: 157 MMHG | BODY MASS INDEX: 26.16 KG/M2 | RESPIRATION RATE: 18 BRPM | HEIGHT: 65 IN | HEART RATE: 76 BPM | OXYGEN SATURATION: 99 %

## 2025-08-20 DIAGNOSIS — B02.9 HERPES ZOSTER WITHOUT COMPLICATION: Primary | ICD-10-CM

## 2025-08-20 PROCEDURE — 99214 OFFICE O/P EST MOD 30 MIN: CPT | Mod: ,,, | Performed by: FAMILY MEDICINE

## 2025-08-20 RX ORDER — GABAPENTIN 100 MG/1
100 CAPSULE ORAL 3 TIMES DAILY
Qty: 21 CAPSULE | Refills: 0 | Status: SHIPPED | OUTPATIENT
Start: 2025-08-20 | End: 2025-08-27

## 2025-08-20 RX ORDER — VALACYCLOVIR HYDROCHLORIDE 1 G/1
1000 TABLET, FILM COATED ORAL 3 TIMES DAILY
Qty: 21 TABLET | Refills: 0 | Status: SHIPPED | OUTPATIENT
Start: 2025-08-20 | End: 2025-08-27

## 2025-09-03 ENCOUNTER — OFFICE VISIT (OUTPATIENT)
Dept: FAMILY MEDICINE | Facility: CLINIC | Age: 70
End: 2025-09-03
Payer: MEDICARE

## 2025-09-03 ENCOUNTER — RESULTS FOLLOW-UP (OUTPATIENT)
Dept: FAMILY MEDICINE | Facility: CLINIC | Age: 70
End: 2025-09-03

## 2025-09-03 VITALS
HEIGHT: 65 IN | HEART RATE: 77 BPM | OXYGEN SATURATION: 98 % | SYSTOLIC BLOOD PRESSURE: 130 MMHG | DIASTOLIC BLOOD PRESSURE: 86 MMHG | TEMPERATURE: 97 F | BODY MASS INDEX: 26.33 KG/M2 | WEIGHT: 158 LBS

## 2025-09-03 DIAGNOSIS — R73.01 IMPAIRED FASTING GLUCOSE: ICD-10-CM

## 2025-09-03 DIAGNOSIS — I10 PRIMARY HYPERTENSION: ICD-10-CM

## 2025-09-03 DIAGNOSIS — B02.29: Primary | ICD-10-CM

## 2025-09-03 DIAGNOSIS — I10 HYPERTENSION, UNSPECIFIED TYPE: ICD-10-CM

## 2025-09-03 DIAGNOSIS — E55.9 VITAMIN D DEFICIENCY: ICD-10-CM

## 2025-09-03 DIAGNOSIS — Z86.39 HISTORY OF NON ANEMIC VITAMIN B12 DEFICIENCY: ICD-10-CM

## 2025-09-03 DIAGNOSIS — E78.5 HYPERLIPIDEMIA LDL GOAL <130: ICD-10-CM

## 2025-09-03 DIAGNOSIS — B02.9 HERPES ZOSTER WITHOUT COMPLICATION: ICD-10-CM

## 2025-09-03 LAB
ALBUMIN SERPL-MCNC: 4.2 G/DL (ref 3.4–4.8)
ALBUMIN/GLOB SERPL: 1.7 RATIO (ref 1.1–2)
ALP SERPL-CCNC: 62 UNIT/L (ref 40–150)
ALT SERPL-CCNC: 16 UNIT/L (ref 0–55)
ANION GAP SERPL CALC-SCNC: 9 MEQ/L
AST SERPL-CCNC: 20 UNIT/L (ref 11–45)
BASOPHILS # BLD AUTO: 0.04 X10(3)/MCL (ref 0.01–0.08)
BASOPHILS NFR BLD AUTO: 0.7 % (ref 0.1–1.2)
BILIRUB SERPL-MCNC: 1.1 MG/DL
BUN SERPL-MCNC: 15 MG/DL (ref 9.8–20.1)
CALCIUM SERPL-MCNC: 9.5 MG/DL (ref 8.4–10.2)
CHLORIDE SERPL-SCNC: 106 MMOL/L (ref 98–107)
CHOLEST SERPL-MCNC: 187 MG/DL
CHOLEST/HDLC SERPL: 3 {RATIO} (ref 0–5)
CO2 SERPL-SCNC: 26 MMOL/L (ref 23–31)
CREAT SERPL-MCNC: 0.75 MG/DL (ref 0.55–1.02)
CREAT/UREA NIT SERPL: 20
EOSINOPHIL # BLD AUTO: 0.17 X10(3)/MCL (ref 0.04–0.36)
EOSINOPHIL NFR BLD AUTO: 3.2 % (ref 0.7–7)
ERYTHROCYTE [DISTWIDTH] IN BLOOD BY AUTOMATED COUNT: 12.9 % (ref 11–14.5)
EST. AVERAGE GLUCOSE BLD GHB EST-MCNC: 102.5 MG/DL
GFR SERPLBLD CREATININE-BSD FMLA CKD-EPI: 85 ML/MIN/1.73/M2
GLOBULIN SER-MCNC: 2.5 GM/DL (ref 2.4–3.5)
GLUCOSE SERPL-MCNC: 97 MG/DL (ref 82–115)
HBA1C MFR BLD: 5.2 %
HCT VFR BLD AUTO: 45.8 % (ref 36–48)
HDLC SERPL-MCNC: 56 MG/DL (ref 35–60)
HGB BLD-MCNC: 14.9 G/DL (ref 11.8–16)
IMM GRANULOCYTES # BLD AUTO: 0.01 X10(3)/MCL (ref 0–0.03)
IMM GRANULOCYTES NFR BLD AUTO: 0.2 % (ref 0–0.5)
LDLC SERPL CALC-MCNC: 103 MG/DL (ref 50–140)
LYMPHOCYTES # BLD AUTO: 1.79 X10(3)/MCL (ref 1.16–3.74)
LYMPHOCYTES NFR BLD AUTO: 33.5 % (ref 20–55)
MCH RBC QN AUTO: 28.5 PG (ref 27–34)
MCHC RBC AUTO-ENTMCNC: 32.5 G/DL (ref 31–37)
MCV RBC AUTO: 87.6 FL (ref 79–99)
MONOCYTES # BLD AUTO: 0.31 X10(3)/MCL (ref 0.24–0.36)
MONOCYTES NFR BLD AUTO: 5.8 % (ref 4.7–12.5)
NEUTROPHILS # BLD AUTO: 3.02 X10(3)/MCL (ref 1.56–6.13)
NEUTROPHILS NFR BLD AUTO: 56.6 % (ref 37–73)
NRBC BLD AUTO-RTO: 0 %
PLATELET # BLD AUTO: 310 X10(3)/MCL (ref 140–371)
PMV BLD AUTO: 9.6 FL (ref 9.4–12.4)
POTASSIUM SERPL-SCNC: 4.2 MMOL/L (ref 3.5–5.1)
PROT SERPL-MCNC: 6.7 GM/DL (ref 5.8–7.6)
RBC # BLD AUTO: 5.23 X10(6)/MCL (ref 4–5.1)
SODIUM SERPL-SCNC: 141 MMOL/L (ref 136–145)
TRIGL SERPL-MCNC: 140 MG/DL (ref 37–140)
TSH SERPL-ACNC: 0.66 UIU/ML (ref 0.35–4.94)
VLDLC SERPL CALC-MCNC: 28 MG/DL
WBC # BLD AUTO: 5.34 X10(3)/MCL (ref 4–11.5)

## 2025-09-03 PROCEDURE — 4010F ACE/ARB THERAPY RXD/TAKEN: CPT | Mod: ,,, | Performed by: NURSE PRACTITIONER

## 2025-09-03 PROCEDURE — 3075F SYST BP GE 130 - 139MM HG: CPT | Mod: ,,, | Performed by: NURSE PRACTITIONER

## 2025-09-03 PROCEDURE — 99214 OFFICE O/P EST MOD 30 MIN: CPT | Mod: ,,, | Performed by: NURSE PRACTITIONER

## 2025-09-03 PROCEDURE — 1159F MED LIST DOCD IN RCRD: CPT | Mod: ,,, | Performed by: NURSE PRACTITIONER

## 2025-09-03 PROCEDURE — 3008F BODY MASS INDEX DOCD: CPT | Mod: ,,, | Performed by: NURSE PRACTITIONER

## 2025-09-03 PROCEDURE — 1160F RVW MEDS BY RX/DR IN RCRD: CPT | Mod: ,,, | Performed by: NURSE PRACTITIONER

## 2025-09-03 PROCEDURE — 3079F DIAST BP 80-89 MM HG: CPT | Mod: ,,, | Performed by: NURSE PRACTITIONER

## 2025-09-03 RX ORDER — LOSARTAN POTASSIUM 50 MG/1
50 TABLET ORAL 2 TIMES DAILY
Qty: 180 TABLET | Refills: 1 | Status: SHIPPED | OUTPATIENT
Start: 2025-09-03

## 2025-09-03 RX ORDER — GABAPENTIN 300 MG/1
300 CAPSULE ORAL 3 TIMES DAILY
Qty: 60 CAPSULE | Refills: 2 | Status: SHIPPED | OUTPATIENT
Start: 2025-09-03

## 2025-09-03 RX ORDER — LIDOCAINE 50 MG/G
1 PATCH TOPICAL DAILY
Qty: 30 PATCH | Refills: 3 | Status: SHIPPED | OUTPATIENT
Start: 2025-09-03

## 2025-09-04 LAB — 25(OH)D3+25(OH)D2 SERPL-MCNC: 52 NG/ML (ref 30–80)
